# Patient Record
Sex: FEMALE | Race: WHITE | NOT HISPANIC OR LATINO | Employment: OTHER | URBAN - METROPOLITAN AREA
[De-identification: names, ages, dates, MRNs, and addresses within clinical notes are randomized per-mention and may not be internally consistent; named-entity substitution may affect disease eponyms.]

---

## 2024-04-28 ENCOUNTER — APPOINTMENT (OUTPATIENT)
Dept: RADIOLOGY | Facility: HOSPITAL | Age: 69
End: 2024-04-28
Payer: MEDICARE

## 2024-04-28 ENCOUNTER — APPOINTMENT (OUTPATIENT)
Dept: CARDIOLOGY | Facility: HOSPITAL | Age: 69
End: 2024-04-28
Payer: MEDICARE

## 2024-04-28 ENCOUNTER — HOSPITAL ENCOUNTER (OUTPATIENT)
Facility: HOSPITAL | Age: 69
Setting detail: OBSERVATION
Discharge: HOME | End: 2024-04-29
Attending: STUDENT IN AN ORGANIZED HEALTH CARE EDUCATION/TRAINING PROGRAM | Admitting: INTERNAL MEDICINE
Payer: MEDICARE

## 2024-04-28 DIAGNOSIS — I15.9 SECONDARY HYPERTENSION: ICD-10-CM

## 2024-04-28 DIAGNOSIS — R56.9 SEIZURE (MULTI): Primary | ICD-10-CM

## 2024-04-28 LAB
ALBUMIN SERPL BCP-MCNC: 4.3 G/DL (ref 3.4–5)
ALP SERPL-CCNC: 33 U/L (ref 33–136)
ALT SERPL W P-5'-P-CCNC: 10 U/L (ref 7–45)
ANION GAP SERPL CALC-SCNC: 14 MMOL/L (ref 10–20)
APPEARANCE UR: ABNORMAL
AST SERPL W P-5'-P-CCNC: 13 U/L (ref 9–39)
BACTERIA #/AREA URNS AUTO: ABNORMAL /HPF
BASOPHILS # BLD AUTO: 0.05 X10*3/UL (ref 0–0.1)
BASOPHILS NFR BLD AUTO: 1.1 %
BILIRUB SERPL-MCNC: 0.6 MG/DL (ref 0–1.2)
BILIRUB UR STRIP.AUTO-MCNC: NEGATIVE MG/DL
BUN SERPL-MCNC: 23 MG/DL (ref 6–23)
CALCIUM SERPL-MCNC: 9.4 MG/DL (ref 8.6–10.3)
CHLORIDE SERPL-SCNC: 107 MMOL/L (ref 98–107)
CO2 SERPL-SCNC: 23 MMOL/L (ref 21–32)
COLOR UR: YELLOW
CREAT SERPL-MCNC: 1.03 MG/DL (ref 0.5–1.05)
EGFRCR SERPLBLD CKD-EPI 2021: 59 ML/MIN/1.73M*2
EOSINOPHIL # BLD AUTO: 0.32 X10*3/UL (ref 0–0.7)
EOSINOPHIL NFR BLD AUTO: 6.8 %
ERYTHROCYTE [DISTWIDTH] IN BLOOD BY AUTOMATED COUNT: 17.2 % (ref 11.5–14.5)
FLUAV RNA RESP QL NAA+PROBE: NOT DETECTED
FLUBV RNA RESP QL NAA+PROBE: NOT DETECTED
GLUCOSE BLD MANUAL STRIP-MCNC: 122 MG/DL (ref 74–99)
GLUCOSE SERPL-MCNC: 111 MG/DL (ref 74–99)
GLUCOSE UR STRIP.AUTO-MCNC: NEGATIVE MG/DL
HCT VFR BLD AUTO: 36 % (ref 36–46)
HGB BLD-MCNC: 11.6 G/DL (ref 12–16)
IMM GRANULOCYTES # BLD AUTO: 0.01 X10*3/UL (ref 0–0.7)
IMM GRANULOCYTES NFR BLD AUTO: 0.2 % (ref 0–0.9)
KETONES UR STRIP.AUTO-MCNC: NEGATIVE MG/DL
LACTATE SERPL-SCNC: 1.2 MMOL/L (ref 0.4–2)
LEUKOCYTE ESTERASE UR QL STRIP.AUTO: ABNORMAL
LYMPHOCYTES # BLD AUTO: 1.8 X10*3/UL (ref 1.2–4.8)
LYMPHOCYTES NFR BLD AUTO: 38.1 %
MAGNESIUM SERPL-MCNC: 1.81 MG/DL (ref 1.6–2.4)
MCH RBC QN AUTO: 26.2 PG (ref 26–34)
MCHC RBC AUTO-ENTMCNC: 32.2 G/DL (ref 32–36)
MCV RBC AUTO: 81 FL (ref 80–100)
MONOCYTES # BLD AUTO: 0.3 X10*3/UL (ref 0.1–1)
MONOCYTES NFR BLD AUTO: 6.3 %
NEUTROPHILS # BLD AUTO: 2.25 X10*3/UL (ref 1.2–7.7)
NEUTROPHILS NFR BLD AUTO: 47.5 %
NITRITE UR QL STRIP.AUTO: POSITIVE
NRBC BLD-RTO: 0 /100 WBCS (ref 0–0)
PH UR STRIP.AUTO: 6 [PH]
PLATELET # BLD AUTO: 293 X10*3/UL (ref 150–450)
POTASSIUM SERPL-SCNC: 3.5 MMOL/L (ref 3.5–5.3)
PROT SERPL-MCNC: 6.7 G/DL (ref 6.4–8.2)
PROT UR STRIP.AUTO-MCNC: NEGATIVE MG/DL
RBC # BLD AUTO: 4.43 X10*6/UL (ref 4–5.2)
RBC # UR STRIP.AUTO: NEGATIVE /UL
RBC #/AREA URNS AUTO: ABNORMAL /HPF
SARS-COV-2 RNA RESP QL NAA+PROBE: NOT DETECTED
SODIUM SERPL-SCNC: 140 MMOL/L (ref 136–145)
SP GR UR STRIP.AUTO: 1.01
UROBILINOGEN UR STRIP.AUTO-MCNC: <2 MG/DL
WBC # BLD AUTO: 4.7 X10*3/UL (ref 4.4–11.3)
WBC #/AREA URNS AUTO: ABNORMAL /HPF
WBC CLUMPS #/AREA URNS AUTO: ABNORMAL /HPF

## 2024-04-28 PROCEDURE — G0378 HOSPITAL OBSERVATION PER HR: HCPCS

## 2024-04-28 PROCEDURE — 2500000001 HC RX 250 WO HCPCS SELF ADMINISTERED DRUGS (ALT 637 FOR MEDICARE OP): Performed by: STUDENT IN AN ORGANIZED HEALTH CARE EDUCATION/TRAINING PROGRAM

## 2024-04-28 PROCEDURE — 2500000004 HC RX 250 GENERAL PHARMACY W/ HCPCS (ALT 636 FOR OP/ED): Performed by: STUDENT IN AN ORGANIZED HEALTH CARE EDUCATION/TRAINING PROGRAM

## 2024-04-28 PROCEDURE — 83735 ASSAY OF MAGNESIUM: CPT | Performed by: STUDENT IN AN ORGANIZED HEALTH CARE EDUCATION/TRAINING PROGRAM

## 2024-04-28 PROCEDURE — 2500000004 HC RX 250 GENERAL PHARMACY W/ HCPCS (ALT 636 FOR OP/ED)

## 2024-04-28 PROCEDURE — 99223 1ST HOSP IP/OBS HIGH 75: CPT | Performed by: PSYCHIATRY & NEUROLOGY

## 2024-04-28 PROCEDURE — 83605 ASSAY OF LACTIC ACID: CPT

## 2024-04-28 PROCEDURE — 93005 ELECTROCARDIOGRAM TRACING: CPT

## 2024-04-28 PROCEDURE — 70450 CT HEAD/BRAIN W/O DYE: CPT | Performed by: RADIOLOGY

## 2024-04-28 PROCEDURE — 70450 CT HEAD/BRAIN W/O DYE: CPT

## 2024-04-28 PROCEDURE — 96375 TX/PRO/DX INJ NEW DRUG ADDON: CPT

## 2024-04-28 PROCEDURE — 71045 X-RAY EXAM CHEST 1 VIEW: CPT | Mod: FOREIGN READ | Performed by: RADIOLOGY

## 2024-04-28 PROCEDURE — 85025 COMPLETE CBC W/AUTO DIFF WBC: CPT

## 2024-04-28 PROCEDURE — 99285 EMERGENCY DEPT VISIT HI MDM: CPT | Mod: 25

## 2024-04-28 PROCEDURE — 87636 SARSCOV2 & INF A&B AMP PRB: CPT | Performed by: STUDENT IN AN ORGANIZED HEALTH CARE EDUCATION/TRAINING PROGRAM

## 2024-04-28 PROCEDURE — 81001 URINALYSIS AUTO W/SCOPE: CPT | Performed by: STUDENT IN AN ORGANIZED HEALTH CARE EDUCATION/TRAINING PROGRAM

## 2024-04-28 PROCEDURE — 82947 ASSAY GLUCOSE BLOOD QUANT: CPT

## 2024-04-28 PROCEDURE — 96372 THER/PROPH/DIAG INJ SC/IM: CPT

## 2024-04-28 PROCEDURE — 99222 1ST HOSP IP/OBS MODERATE 55: CPT

## 2024-04-28 PROCEDURE — 71045 X-RAY EXAM CHEST 1 VIEW: CPT

## 2024-04-28 PROCEDURE — 36415 COLL VENOUS BLD VENIPUNCTURE: CPT

## 2024-04-28 PROCEDURE — 2500000006 HC RX 250 W HCPCS SELF ADMINISTERED DRUGS (ALT 637 FOR ALL PAYERS)

## 2024-04-28 PROCEDURE — 80053 COMPREHEN METABOLIC PANEL: CPT

## 2024-04-28 PROCEDURE — 2500000001 HC RX 250 WO HCPCS SELF ADMINISTERED DRUGS (ALT 637 FOR MEDICARE OP)

## 2024-04-28 PROCEDURE — 87086 URINE CULTURE/COLONY COUNT: CPT | Mod: PARLAB | Performed by: STUDENT IN AN ORGANIZED HEALTH CARE EDUCATION/TRAINING PROGRAM

## 2024-04-28 RX ORDER — ATORVASTATIN CALCIUM 80 MG/1
80 TABLET, FILM COATED ORAL EVERY MORNING
COMMUNITY
Start: 2023-10-25

## 2024-04-28 RX ORDER — QUETIAPINE FUMARATE 300 MG/1
1 TABLET, FILM COATED ORAL NIGHTLY
COMMUNITY
Start: 2022-04-04

## 2024-04-28 RX ORDER — ASPIRIN 81 MG/1
81 TABLET ORAL DAILY
Status: DISCONTINUED | OUTPATIENT
Start: 2024-04-28 | End: 2024-04-29 | Stop reason: HOSPADM

## 2024-04-28 RX ORDER — FOLIC ACID 1 MG/1
1 TABLET ORAL DAILY
Status: DISCONTINUED | OUTPATIENT
Start: 2024-04-28 | End: 2024-04-29 | Stop reason: HOSPADM

## 2024-04-28 RX ORDER — PROPRANOLOL HYDROCHLORIDE 60 MG/1
1 CAPSULE, EXTENDED RELEASE ORAL DAILY
COMMUNITY
Start: 2022-05-25

## 2024-04-28 RX ORDER — BUSPIRONE HYDROCHLORIDE 10 MG/1
30 TABLET ORAL 2 TIMES DAILY
Status: DISCONTINUED | OUTPATIENT
Start: 2024-04-28 | End: 2024-04-29 | Stop reason: HOSPADM

## 2024-04-28 RX ORDER — SUMATRIPTAN 50 MG/1
100 TABLET, FILM COATED ORAL ONCE AS NEEDED
Status: DISCONTINUED | OUTPATIENT
Start: 2024-04-28 | End: 2024-04-29 | Stop reason: HOSPADM

## 2024-04-28 RX ORDER — DIAZEPAM 5 MG/ML
5 INJECTION, SOLUTION INTRAMUSCULAR; INTRAVENOUS EVERY 2 HOUR PRN
Status: DISCONTINUED | OUTPATIENT
Start: 2024-04-28 | End: 2024-04-29 | Stop reason: HOSPADM

## 2024-04-28 RX ORDER — FENOFIBRATE 145 MG/1
1 TABLET, FILM COATED ORAL DAILY
COMMUNITY

## 2024-04-28 RX ORDER — HYDROXYZINE PAMOATE 25 MG/1
50 CAPSULE ORAL 3 TIMES DAILY PRN
Status: DISCONTINUED | OUTPATIENT
Start: 2024-04-28 | End: 2024-04-29 | Stop reason: HOSPADM

## 2024-04-28 RX ORDER — FENOFIBRATE 160 MG/1
160 TABLET ORAL DAILY
Status: DISCONTINUED | OUTPATIENT
Start: 2024-04-28 | End: 2024-04-29 | Stop reason: HOSPADM

## 2024-04-28 RX ORDER — BUSPIRONE HYDROCHLORIDE 30 MG/1
30 TABLET ORAL 2 TIMES DAILY
COMMUNITY
Start: 2024-01-22

## 2024-04-28 RX ORDER — ALBUTEROL SULFATE 90 UG/1
2 AEROSOL, METERED RESPIRATORY (INHALATION) EVERY 2 HOUR PRN
Status: DISCONTINUED | OUTPATIENT
Start: 2024-04-28 | End: 2024-04-29 | Stop reason: HOSPADM

## 2024-04-28 RX ORDER — QUETIAPINE FUMARATE 100 MG/1
300 TABLET, FILM COATED ORAL NIGHTLY
Status: DISCONTINUED | OUTPATIENT
Start: 2024-04-28 | End: 2024-04-29 | Stop reason: HOSPADM

## 2024-04-28 RX ORDER — FLUTICASONE PROPIONATE 50 MCG
1 SPRAY, SUSPENSION (ML) NASAL DAILY PRN
COMMUNITY

## 2024-04-28 RX ORDER — ONDANSETRON 4 MG/1
4 TABLET, FILM COATED ORAL EVERY 8 HOURS PRN
Status: DISCONTINUED | OUTPATIENT
Start: 2024-04-28 | End: 2024-04-29 | Stop reason: HOSPADM

## 2024-04-28 RX ORDER — ALBUTEROL SULFATE 90 UG/1
2 AEROSOL, METERED RESPIRATORY (INHALATION) EVERY 6 HOURS PRN
Status: DISCONTINUED | OUTPATIENT
Start: 2024-04-28 | End: 2024-04-28

## 2024-04-28 RX ORDER — MULTIVIT-MIN/IRON FUM/FOLIC AC 7.5 MG-4
1 TABLET ORAL DAILY
Status: DISCONTINUED | OUTPATIENT
Start: 2024-04-28 | End: 2024-04-29 | Stop reason: HOSPADM

## 2024-04-28 RX ORDER — PANTOPRAZOLE SODIUM 40 MG/1
1 TABLET, DELAYED RELEASE ORAL
COMMUNITY
Start: 2018-04-27

## 2024-04-28 RX ORDER — LANOLIN ALCOHOL/MO/W.PET/CERES
100 CREAM (GRAM) TOPICAL DAILY
Status: DISCONTINUED | OUTPATIENT
Start: 2024-05-01 | End: 2024-04-29 | Stop reason: HOSPADM

## 2024-04-28 RX ORDER — CEPHRADINE 500 MG
1 CAPSULE ORAL WEEKLY
COMMUNITY

## 2024-04-28 RX ORDER — SUMATRIPTAN SUCCINATE 100 MG/1
100 TABLET ORAL ONCE AS NEEDED
COMMUNITY
Start: 2018-08-09

## 2024-04-28 RX ORDER — ENOXAPARIN SODIUM 100 MG/ML
40 INJECTION SUBCUTANEOUS EVERY 24 HOURS
Status: DISCONTINUED | OUTPATIENT
Start: 2024-04-28 | End: 2024-04-29 | Stop reason: HOSPADM

## 2024-04-28 RX ORDER — ALPRAZOLAM 0.5 MG/1
0.5 TABLET ORAL EVERY 8 HOURS PRN
COMMUNITY
Start: 2024-04-01 | End: 2024-05-01

## 2024-04-28 RX ORDER — HYDROXYZINE PAMOATE 50 MG/1
1 CAPSULE ORAL 3 TIMES DAILY PRN
COMMUNITY

## 2024-04-28 RX ORDER — ALPRAZOLAM 0.25 MG/1
0.5 TABLET ORAL EVERY 8 HOURS PRN
Status: DISCONTINUED | OUTPATIENT
Start: 2024-04-28 | End: 2024-04-29 | Stop reason: HOSPADM

## 2024-04-28 RX ORDER — MECLIZINE HYDROCHLORIDE 25 MG/1
1 TABLET ORAL 3 TIMES DAILY PRN
COMMUNITY
Start: 2024-01-01

## 2024-04-28 RX ORDER — PANTOPRAZOLE SODIUM 40 MG/1
40 TABLET, DELAYED RELEASE ORAL
Status: DISCONTINUED | OUTPATIENT
Start: 2024-04-28 | End: 2024-04-29 | Stop reason: HOSPADM

## 2024-04-28 RX ORDER — MECLIZINE HYDROCHLORIDE 25 MG/1
25 TABLET ORAL 3 TIMES DAILY PRN
Status: DISCONTINUED | OUTPATIENT
Start: 2024-04-28 | End: 2024-04-29 | Stop reason: HOSPADM

## 2024-04-28 RX ORDER — ALBUTEROL SULFATE 90 UG/1
2 AEROSOL, METERED RESPIRATORY (INHALATION) EVERY 6 HOURS PRN
COMMUNITY
Start: 2023-02-24

## 2024-04-28 RX ORDER — POLYETHYLENE GLYCOL 3350 17 G/17G
17 POWDER, FOR SOLUTION ORAL DAILY
Status: DISCONTINUED | OUTPATIENT
Start: 2024-04-28 | End: 2024-04-29 | Stop reason: HOSPADM

## 2024-04-28 RX ORDER — FLUOXETINE HYDROCHLORIDE 20 MG/1
40 CAPSULE ORAL DAILY
Status: DISCONTINUED | OUTPATIENT
Start: 2024-04-28 | End: 2024-04-29 | Stop reason: HOSPADM

## 2024-04-28 RX ORDER — ATORVASTATIN CALCIUM 80 MG/1
80 TABLET, FILM COATED ORAL EVERY MORNING
Status: DISCONTINUED | OUTPATIENT
Start: 2024-04-28 | End: 2024-04-29 | Stop reason: HOSPADM

## 2024-04-28 RX ORDER — GABAPENTIN 800 MG/1
1 TABLET ORAL 4 TIMES DAILY
COMMUNITY
Start: 2023-07-06

## 2024-04-28 RX ORDER — ASPIRIN 81 MG/1
81 TABLET ORAL DAILY
COMMUNITY
Start: 2022-05-25

## 2024-04-28 RX ORDER — GABAPENTIN 300 MG/1
300 CAPSULE ORAL 3 TIMES DAILY
Status: DISCONTINUED | OUTPATIENT
Start: 2024-04-28 | End: 2024-04-29 | Stop reason: HOSPADM

## 2024-04-28 RX ORDER — FLUOXETINE HYDROCHLORIDE 40 MG/1
1 CAPSULE ORAL DAILY
COMMUNITY
Start: 2022-04-04

## 2024-04-28 RX ORDER — ONDANSETRON HYDROCHLORIDE 2 MG/ML
4 INJECTION, SOLUTION INTRAVENOUS EVERY 8 HOURS PRN
Status: DISCONTINUED | OUTPATIENT
Start: 2024-04-28 | End: 2024-04-29 | Stop reason: HOSPADM

## 2024-04-28 RX ORDER — PROPRANOLOL HYDROCHLORIDE 60 MG/1
60 CAPSULE, EXTENDED RELEASE ORAL DAILY
Status: DISCONTINUED | OUTPATIENT
Start: 2024-04-28 | End: 2024-04-29 | Stop reason: HOSPADM

## 2024-04-28 RX ORDER — THIAMINE HYDROCHLORIDE 100 MG/ML
100 INJECTION, SOLUTION INTRAMUSCULAR; INTRAVENOUS DAILY
Status: DISCONTINUED | OUTPATIENT
Start: 2024-04-28 | End: 2024-04-29 | Stop reason: HOSPADM

## 2024-04-28 RX ORDER — FLUTICASONE PROPIONATE 50 MCG
1 SPRAY, SUSPENSION (ML) NASAL DAILY PRN
Status: DISCONTINUED | OUTPATIENT
Start: 2024-04-28 | End: 2024-04-29 | Stop reason: HOSPADM

## 2024-04-28 RX ADMIN — THIAMINE HYDROCHLORIDE 100 MG: 100 INJECTION, SOLUTION INTRAMUSCULAR; INTRAVENOUS at 11:45

## 2024-04-28 RX ADMIN — PROPRANOLOL HYDROCHLORIDE 60 MG: 60 CAPSULE, EXTENDED RELEASE ORAL at 16:18

## 2024-04-28 RX ADMIN — ALPRAZOLAM 0.5 MG: 0.25 TABLET ORAL at 16:18

## 2024-04-28 RX ADMIN — Medication 1 TABLET: at 11:46

## 2024-04-28 RX ADMIN — FOLIC ACID 1 MG: 1 TABLET ORAL at 11:45

## 2024-04-28 RX ADMIN — FLUOXETINE 40 MG: 20 CAPSULE ORAL at 16:18

## 2024-04-28 RX ADMIN — GABAPENTIN 300 MG: 300 CAPSULE ORAL at 16:18

## 2024-04-28 RX ADMIN — FENOFIBRATE 160 MG: 160 TABLET ORAL at 16:18

## 2024-04-28 RX ADMIN — BUSPIRONE HYDROCHLORIDE 30 MG: 10 TABLET ORAL at 16:18

## 2024-04-28 RX ADMIN — QUETIAPINE FUMARATE 300 MG: 100 TABLET ORAL at 21:13

## 2024-04-28 RX ADMIN — BUSPIRONE HYDROCHLORIDE 30 MG: 10 TABLET ORAL at 22:51

## 2024-04-28 RX ADMIN — ASPIRIN 81 MG: 81 TABLET, COATED ORAL at 16:18

## 2024-04-28 RX ADMIN — ENOXAPARIN SODIUM 40 MG: 40 INJECTION SUBCUTANEOUS at 21:12

## 2024-04-28 RX ADMIN — GABAPENTIN 300 MG: 300 CAPSULE ORAL at 21:13

## 2024-04-28 RX ADMIN — PANTOPRAZOLE SODIUM 40 MG: 40 TABLET, DELAYED RELEASE ORAL at 17:58

## 2024-04-28 SDOH — SOCIAL STABILITY: SOCIAL INSECURITY: DO YOU FEEL UNSAFE GOING BACK TO THE PLACE WHERE YOU ARE LIVING?: NO

## 2024-04-28 SDOH — ECONOMIC STABILITY: HOUSING INSECURITY
IN THE LAST 12 MONTHS, WAS THERE A TIME WHEN YOU DID NOT HAVE A STEADY PLACE TO SLEEP OR SLEPT IN A SHELTER (INCLUDING NOW)?: NO

## 2024-04-28 SDOH — HEALTH STABILITY: PHYSICAL HEALTH: ON AVERAGE, HOW MANY MINUTES DO YOU ENGAGE IN EXERCISE AT THIS LEVEL?: 0 MIN

## 2024-04-28 SDOH — HEALTH STABILITY: PHYSICAL HEALTH: ON AVERAGE, HOW MANY DAYS PER WEEK DO YOU ENGAGE IN MODERATE TO STRENUOUS EXERCISE (LIKE A BRISK WALK)?: 0 DAYS

## 2024-04-28 SDOH — SOCIAL STABILITY: SOCIAL INSECURITY: DOES ANYONE TRY TO KEEP YOU FROM HAVING/CONTACTING OTHER FRIENDS OR DOING THINGS OUTSIDE YOUR HOME?: NO

## 2024-04-28 SDOH — ECONOMIC STABILITY: HOUSING INSECURITY: IN THE LAST 12 MONTHS, HOW MANY PLACES HAVE YOU LIVED?: 1

## 2024-04-28 SDOH — ECONOMIC STABILITY: TRANSPORTATION INSECURITY
IN THE PAST 12 MONTHS, HAS LACK OF TRANSPORTATION KEPT YOU FROM MEETINGS, WORK, OR FROM GETTING THINGS NEEDED FOR DAILY LIVING?: NO

## 2024-04-28 SDOH — SOCIAL STABILITY: SOCIAL INSECURITY: HAVE YOU HAD THOUGHTS OF HARMING ANYONE ELSE?: NO

## 2024-04-28 SDOH — SOCIAL STABILITY: SOCIAL INSECURITY: DO YOU FEEL ANYONE HAS EXPLOITED OR TAKEN ADVANTAGE OF YOU FINANCIALLY OR OF YOUR PERSONAL PROPERTY?: NO

## 2024-04-28 SDOH — SOCIAL STABILITY: SOCIAL INSECURITY: HAVE YOU HAD ANY THOUGHTS OF HARMING ANYONE ELSE?: NO

## 2024-04-28 SDOH — ECONOMIC STABILITY: INCOME INSECURITY: HOW HARD IS IT FOR YOU TO PAY FOR THE VERY BASICS LIKE FOOD, HOUSING, MEDICAL CARE, AND HEATING?: NOT HARD AT ALL

## 2024-04-28 SDOH — ECONOMIC STABILITY: INCOME INSECURITY: IN THE LAST 12 MONTHS, WAS THERE A TIME WHEN YOU WERE NOT ABLE TO PAY THE MORTGAGE OR RENT ON TIME?: NO

## 2024-04-28 SDOH — SOCIAL STABILITY: SOCIAL INSECURITY: ARE THERE ANY APPARENT SIGNS OF INJURIES/BEHAVIORS THAT COULD BE RELATED TO ABUSE/NEGLECT?: NO

## 2024-04-28 SDOH — SOCIAL STABILITY: SOCIAL INSECURITY: ARE YOU OR HAVE YOU BEEN THREATENED OR ABUSED PHYSICALLY, EMOTIONALLY, OR SEXUALLY BY ANYONE?: NO

## 2024-04-28 SDOH — SOCIAL STABILITY: SOCIAL INSECURITY: HAS ANYONE EVER THREATENED TO HURT YOUR FAMILY OR YOUR PETS?: NO

## 2024-04-28 SDOH — ECONOMIC STABILITY: TRANSPORTATION INSECURITY
IN THE PAST 12 MONTHS, HAS THE LACK OF TRANSPORTATION KEPT YOU FROM MEDICAL APPOINTMENTS OR FROM GETTING MEDICATIONS?: NO

## 2024-04-28 SDOH — SOCIAL STABILITY: SOCIAL INSECURITY: ABUSE: ADULT

## 2024-04-28 SDOH — SOCIAL STABILITY: SOCIAL INSECURITY: WERE YOU ABLE TO COMPLETE ALL THE BEHAVIORAL HEALTH SCREENINGS?: YES

## 2024-04-28 ASSESSMENT — ACTIVITIES OF DAILY LIVING (ADL)
PATIENT'S MEMORY ADEQUATE TO SAFELY COMPLETE DAILY ACTIVITIES?: YES
GROOMING: INDEPENDENT
ASSISTIVE_DEVICE: WALKER;DENTURES UPPER;DENTURES LOWER
WALKS IN HOME: INDEPENDENT
HEARING - RIGHT EAR: FUNCTIONAL
TOILETING: NEEDS ASSISTANCE
HEARING - LEFT EAR: FUNCTIONAL
BATHING: INDEPENDENT
FEEDING YOURSELF: INDEPENDENT
JUDGMENT_ADEQUATE_SAFELY_COMPLETE_DAILY_ACTIVITIES: YES
DRESSING YOURSELF: INDEPENDENT
ADEQUATE_TO_COMPLETE_ADL: YES

## 2024-04-28 ASSESSMENT — LIFESTYLE VARIABLES
TREMOR: NO TREMOR
PAROXYSMAL SWEATS: NO SWEAT VISIBLE
ORIENTATION AND CLOUDING OF SENSORIUM: ORIENTED AND CAN DO SERIAL ADDITIONS
VISUAL DISTURBANCES: NOT PRESENT
TOTAL SCORE: 0
AGITATION: NORMAL ACTIVITY
ANXIETY: NO ANXIETY, AT EASE
AGITATION: NORMAL ACTIVITY
HAVE YOU EVER FELT YOU SHOULD CUT DOWN ON YOUR DRINKING: NO
TOTAL SCORE: 0
PAROXYSMAL SWEATS: NO SWEAT VISIBLE
HAVE PEOPLE ANNOYED YOU BY CRITICIZING YOUR DRINKING: NO
SUBSTANCE_ABUSE_PAST_12_MONTHS: NO
NAUSEA AND VOMITING: NO NAUSEA AND NO VOMITING
TREMOR: NO TREMOR
HEADACHE, FULLNESS IN HEAD: NOT PRESENT
PRESCIPTION_ABUSE_PAST_12_MONTHS: NO
TACTILE DISTURBANCES: VERY MILD ITCHING, PINS AND NEEDLES, BURNING OR NUMBNESS
EVER HAD A DRINK FIRST THING IN THE MORNING TO STEADY YOUR NERVES TO GET RID OF A HANGOVER: NO
PAROXYSMAL SWEATS: NO SWEAT VISIBLE
HEADACHE, FULLNESS IN HEAD: NOT PRESENT
VISUAL DISTURBANCES: NOT PRESENT
TREMOR: NO TREMOR
VISUAL DISTURBANCES: NOT PRESENT
TOTAL SCORE: 1
HOW OFTEN DO YOU HAVE A DRINK CONTAINING ALCOHOL: NEVER
HOW OFTEN DO YOU HAVE 6 OR MORE DRINKS ON ONE OCCASION: NEVER
TOTAL SCORE: 3
ANXIETY: NO ANXIETY, AT EASE
ANXIETY: 2
HEADACHE, FULLNESS IN HEAD: NOT PRESENT
AGITATION: NORMAL ACTIVITY
HOW MANY STANDARD DRINKS CONTAINING ALCOHOL DO YOU HAVE ON A TYPICAL DAY: PATIENT DOES NOT DRINK
PAROXYSMAL SWEATS: NO SWEAT VISIBLE
AGITATION: NORMAL ACTIVITY
NAUSEA AND VOMITING: NO NAUSEA AND NO VOMITING
NAUSEA AND VOMITING: NO NAUSEA AND NO VOMITING
ORIENTATION AND CLOUDING OF SENSORIUM: ORIENTED AND CAN DO SERIAL ADDITIONS
AUDIT-C TOTAL SCORE: 0
TACTILE DISTURBANCES: VERY MILD ITCHING, PINS AND NEEDLES, BURNING OR NUMBNESS
ORIENTATION AND CLOUDING OF SENSORIUM: ORIENTED AND CAN DO SERIAL ADDITIONS
ANXIETY: NO ANXIETY, AT EASE
AUDITORY DISTURBANCES: NOT PRESENT
ORIENTATION AND CLOUDING OF SENSORIUM: ORIENTED AND CAN DO SERIAL ADDITIONS
AUDIT-C TOTAL SCORE: 0
SKIP TO QUESTIONS 9-10: 1
EVER FELT BAD OR GUILTY ABOUT YOUR DRINKING: NO
TREMOR: NO TREMOR
AUDITORY DISTURBANCES: NOT PRESENT
AUDITORY DISTURBANCES: NOT PRESENT
TOTAL SCORE: 0
VISUAL DISTURBANCES: NOT PRESENT
AUDITORY DISTURBANCES: NOT PRESENT
HEADACHE, FULLNESS IN HEAD: NOT PRESENT
NAUSEA AND VOMITING: NO NAUSEA AND NO VOMITING

## 2024-04-28 ASSESSMENT — COGNITIVE AND FUNCTIONAL STATUS - GENERAL
DAILY ACTIVITIY SCORE: 23
MOBILITY SCORE: 24
TOILETING: A LITTLE
PATIENT BASELINE BEDBOUND: NO
TOILETING: A LITTLE
DAILY ACTIVITIY SCORE: 23
MOBILITY SCORE: 24

## 2024-04-28 ASSESSMENT — COLUMBIA-SUICIDE SEVERITY RATING SCALE - C-SSRS
6. HAVE YOU EVER DONE ANYTHING, STARTED TO DO ANYTHING, OR PREPARED TO DO ANYTHING TO END YOUR LIFE?: NO
1. IN THE PAST MONTH, HAVE YOU WISHED YOU WERE DEAD OR WISHED YOU COULD GO TO SLEEP AND NOT WAKE UP?: NO
2. HAVE YOU ACTUALLY HAD ANY THOUGHTS OF KILLING YOURSELF?: NO

## 2024-04-28 ASSESSMENT — PAIN SCALES - GENERAL
PAINLEVEL_OUTOF10: 0 - NO PAIN
PAINLEVEL_OUTOF10: 0 - NO PAIN

## 2024-04-28 ASSESSMENT — PAIN - FUNCTIONAL ASSESSMENT: PAIN_FUNCTIONAL_ASSESSMENT: 0-10

## 2024-04-28 ASSESSMENT — PATIENT HEALTH QUESTIONNAIRE - PHQ9
1. LITTLE INTEREST OR PLEASURE IN DOING THINGS: MORE THAN HALF THE DAYS
2. FEELING DOWN, DEPRESSED OR HOPELESS: MORE THAN HALF THE DAYS
SUM OF ALL RESPONSES TO PHQ9 QUESTIONS 1 & 2: 4

## 2024-04-28 ASSESSMENT — ENCOUNTER SYMPTOMS
SPEECH DIFFICULTY: 1
SEIZURES: 1

## 2024-04-28 NOTE — PROGRESS NOTES
Pharmacy Medication History Review    Taylor Winslow is a 68 y.o. female admitted for No Principal Problem: There is no principal problem currently on the Problem List. Please update the Problem List and refresh.. Pharmacy reviewed the patient's [id number] medications and allergies for accuracy.    The list below reflectives the updated PTA list. Please review each medication in order reconciliation for additional clarification and justification.  Prior to Admission medications    Medication Sig Start Date End Date Taking? Authorizing Provider   albuterol (ProAir HFA) 90 mcg/actuation inhaler Inhale 2 puffs every 6 hours if needed for shortness of breath or wheezing. 2/24/23  Yes Historical Provider, MD   aspirin 81 mg EC tablet Take 1 tablet (81 mg) by mouth once daily. 5/25/22  Yes Historical Provider, MD   atorvastatin (Lipitor) 80 mg tablet Take 1 tablet (80 mg) by mouth once daily in the morning. 10/25/23  Yes Historical Provider, MD   busPIRone (Buspar) 30 mg tablet Take 1 tablet (30 mg) by mouth twice a day. 1/22/24  Yes Historical Provider, MD   cholecalciferol, vitamin D3, 250 mcg (10,000 unit) capsule Take 1 capsule (250 mcg) by mouth 1 (one) time per week in the early morning.. Every Sunday   Yes Historical Provider, MD   fenofibrate (Tricor) 145 mg tablet Take 1 tablet (145 mg) by mouth once daily.   Yes Historical Provider, MD   FLUoxetine (PROzac) 40 mg capsule Take 1 capsule (40 mg) by mouth once daily. 4/4/22  Yes Historical Provider, MD   fluticasone (Flonase) 50 mcg/actuation nasal spray Administer 1 spray into each nostril once daily as needed for rhinitis.   Yes Historical Provider, MD   gabapentin (Neurontin) 800 mg tablet Take 1 tablet (800 mg) by mouth 4 times a day. 7/6/23  Yes Historical Provider, MD   hydrOXYzine pamoate (Vistaril) 50 mg capsule Take 1 capsule (50 mg) by mouth 3 times a day as needed for anxiety.   Yes Historical Provider, MD   meclizine (Antivert) 25 mg tablet  Take 1 tablet (25 mg) by mouth 3 times a day as needed for dizziness. 1/1/24  Yes Historical Provider, MD   multivitamin with minerals iron-free (multivit-iron-minerals-folic acid) Take 1 tablet by mouth once daily.   Yes Historical Provider, MD   pantoprazole (ProtoNix) 40 mg EC tablet Take 1 tablet (40 mg) by mouth 2 times a day before meals. 4/27/18  Yes Historical Provider, MD   propranolol LA (Inderal LA) 60 mg 24 hr capsule Take 1 capsule (60 mg) by mouth once daily. 5/25/22  Yes Historical Provider, MD   QUEtiapine (SEROquel) 300 mg tablet Take 1 tablet (300 mg) by mouth once daily at bedtime. 4/4/22  Yes Historical Provider, MD   SUMAtriptan (Imitrex) 100 mg tablet Take 1 tablet (100 mg) by mouth 1 time if needed for migraine. 8/9/18  Yes Historical Provider, MD   ALPRAZolam (Xanax) 0.5 mg tablet Take 1 tablet (0.5 mg) by mouth every 8 hours if needed for anxiety. 4/1/24 5/1/24 Yes Historical Provider, MD        The list below reflectives the updated allergy list. Please review each documented allergy for additional clarification and justification.  Allergies  Reviewed by Jennifer Curtis on 4/28/2024        Severity Reactions Comments    Erythromycin Medium Palpitations, Rash, Headache tachycardia heart beats real fast and terrible headache    Aspirin-acetaminophen-caffeine Not Specified Hives     Bromocriptine Not Specified Unknown     Flexeril [cyclobenzaprine] Not Specified Hives     Moxifloxacin Hcl Not Specified GI Upset     Prednisone Not Specified Hives             Below are additional concerns with the patient's PTA list.      Jennifer Curtis

## 2024-04-28 NOTE — ED PROVIDER NOTES
Taylor Winslow is a 68 y.o. female with a PMHx of right frontal lobe CVA in 4/2022 and CVA in 2018, TIAs, PFO, HTN, HLD, BPPV, migraines, fibromyalgia, GERD, IBS, anxiety on Xanax, depression, rectocele, mandibular osteomyelitis who presents to [facility] ED for a 3 minute witnessed seizure episode without LOC.    Subjective     Patient was brought in by EMS.  Patient is postictal, A&O x 4, ran out of Xanax prescription a few days ago.  Unsure if she was given antiseizure medications en route to the hospital.  On ED admission, patient is hypertensive at 201/125, afebrile, HR 73 bpm, 16 RR, satting 96% on RA.  Neuroexam unremarkable; nonfocal grossly, A&O x 4, CN II-XII grossly intact, intact cerebellar function.    A 10 point ROS was performed with the patient denying any complaint at this time aside from those listed in the HPI above.     No current outpatient medications   Past Medical History:   Diagnosis Date    Personal history of other diseases of the circulatory system     History of hypertension    Personal history of other diseases of the digestive system     History of hiatal hernia    Personal history of other endocrine, nutritional and metabolic disease     History of high cholesterol    Personal history of other mental and behavioral disorders     History of depression      Past Surgical History:   Procedure Laterality Date    HYSTERECTOMY  08/24/2018    Hysterectomy    MR HEAD ANGIO WO IV CONTRAST  4/2/2022    MR HEAD ANGIO WO IV CONTRAST 4/2/2022 [facility]    MR NECK ANGIO WO IV CONTRAST  4/2/2022    MR NECK ANGIO WO IV CONTRAST 4/2/2022 [facility]    OTHER SURGICAL HISTORY  08/24/2018    Bunion Correct Osteotomy Blount Double-Stem Lesser MTP Impl    OTHER SURGICAL HISTORY  03/25/2019    Neck surgery    ROTATOR CUFF REPAIR  08/24/2018    Rotator Cuff Repair     No family history on file.  Social History     Socioeconomic History    Marital status:      Spouse name: Not on  "file    Number of children: Not on file    Years of education: Not on file    Highest education level: Not on file   Occupational History    Not on file   Tobacco Use    Smoking status: Not on file    Smokeless tobacco: Not on file   Substance and Sexual Activity    Alcohol use: Not on file    Drug use: Not on file    Sexual activity: Not on file   Other Topics Concern    Not on file   Social History Narrative    Not on file     Social Determinants of Health     Financial Resource Strain: Not on file   Food Insecurity: Not on file   Transportation Needs: Not on file   Physical Activity: Not on file   Stress: Not on file   Social Connections: Not on file   Intimate Partner Violence: Not on file   Housing Stability: Not on file        Code Status: No Order    Objective   Patient Vitals for the past 24 hrs:   BP Temp Pulse Resp SpO2 Height Weight   04/28/24 1200 170/84 -- 61 16 97 % -- --   04/28/24 1145 -- -- 64 -- 98 % -- --   04/28/24 1130 -- -- 66 -- 97 % -- --   04/28/24 1115 (!) 185/92 -- 70 -- 96 % -- --   04/28/24 1103 (!) 201/125 36.6 °C (97.9 °F) 73 16 96 % 1.499 m (4' 11\") 57.2 kg (126 lb)        Labs, radiological imaging and cardiac work up were personally reviewed    Input/Output:  No intake or output data in the 24 hours ending 04/28/24 1228  Ventilator/Oxygen Supply:     Oxygen Therapy/Pulse Ox  Medical Gas Therapy: None (Room air)  Pulse Ox: 97 %  Temperature: 36.6 °C (97.9 °F)  Labs:   Results from last 7 days   Lab Units 04/28/24  1122   SODIUM mmol/L 140   POTASSIUM mmol/L 3.5   CHLORIDE mmol/L 107   CO2 mmol/L 23   BUN mg/dL 23   CREATININE mg/dL 1.03   GLUCOSE mg/dL 111*   CALCIUM mg/dL 9.4     Results from last 7 days   Lab Units 04/28/24  1122   WBC AUTO x10*3/uL 4.7   HEMOGLOBIN g/dL 11.6*   HEMATOCRIT % 36.0   PLATELETS AUTO x10*3/uL 293         Labs Reviewed   CBC WITH AUTO DIFFERENTIAL - Abnormal       Result Value    WBC 4.7      nRBC 0.0      RBC 4.43      Hemoglobin 11.6 (*)     " Hematocrit 36.0      MCV 81      MCH 26.2      MCHC 32.2      RDW 17.2 (*)     Platelets 293      Neutrophils % 47.5      Immature Granulocytes %, Automated 0.2      Lymphocytes % 38.1      Monocytes % 6.3      Eosinophils % 6.8      Basophils % 1.1      Neutrophils Absolute 2.25      Immature Granulocytes Absolute, Automated 0.01      Lymphocytes Absolute 1.80      Monocytes Absolute 0.30      Eosinophils Absolute 0.32      Basophils Absolute 0.05     COMPREHENSIVE METABOLIC PANEL - Abnormal    Glucose 111 (*)     Sodium 140      Potassium 3.5      Chloride 107      Bicarbonate 23      Anion Gap 14      Urea Nitrogen 23      Creatinine 1.03      eGFR 59 (*)     Calcium 9.4      Albumin 4.3      Alkaline Phosphatase 33      Total Protein 6.7      AST 13      Bilirubin, Total 0.6      ALT 10     POCT GLUCOSE - Abnormal    POCT Glucose 122 (*)    LACTATE - Normal    Lactate 1.2      Narrative:     Venipuncture immediately after or during the administration of Metamizole may lead to falsely low results. Testing should be performed immediately  prior to Metamizole dosing.   MAGNESIUM - Normal    Magnesium 1.81     SARS-COV-2 AND INFLUENZA A/B PCR - Normal    Flu A Result Not Detected      Flu B Result Not Detected      Coronavirus 2019, PCR Not Detected      Narrative:     This assay has received FDA Emergency Use Authorization (EUA) and  is only authorized for the duration of time that circumstances exist to justify the authorization of the emergency use of in vitro diagnostic tests for the detection of SARS-CoV-2 virus and/or diagnosis of COVID-19 infection under section 564(b)(1) of the Act, 21 U.S.C. 360bbb-3(b)(1). Testing for SARS-CoV-2 is only recommended for patients who meet current clinical and/or epidemiological criteria as defined by federal, state, or local public health directives. This assay is an in vitro diagnostic nucleic acid amplification test for the qualitative detection of SARS-CoV-2, Influenza A,  and Influenza B from nasopharyngeal specimens and has been validated for use at [facility]. Negative results do not preclude COVID-19 infections or Influenza A/B infections, and should not be used as the sole basis for diagnosis, treatment, or other management decisions. If Influenza A/B and RSV PCR results are negative, testing for Parainfluenza virus, Adenovirus and Metapneumovirus is routinely performed for [facility] pediatric oncology and intensive care inpatients, and is available on other patients by placing an add-on request.    URINALYSIS WITH REFLEX CULTURE AND MICROSCOPIC    Narrative:     The following orders were created for panel order Urinalysis with Reflex Culture and Microscopic.  Procedure                               Abnormality         Status                     ---------                               -----------         ------                     Urinalysis with Reflex C...[[id number]]                                                 Extra Urine Gray Tube[[id number]]                                                         Please view results for these tests on the individual orders.   URINALYSIS WITH REFLEX CULTURE AND MICROSCOPIC   EXTRA URINE GRAY TUBE     Imaging:  BI mammo bilateral screening    Result Date: 4/4/2024  * * *Final Report* * * DATE OF EXAM: Apr  3 2024  2:55PM   [facility]  [facility] SCREENING  / ACCESSION #  [id number] PROCEDURE REASON: Screening mammogram for breast cancer      * * * * Physician Interpretation * * * * RESULT: #[id number] - [facility] SCREENING BILATERAL DIGITAL SCREENING MAMMOGRAM WITH CAD: 4/3/2024 HISTORY: Screening Mammogram For Breast Cancer / Screening Mammogram-Patient reports NO symptoms. / Screening Mammogram-Patient reports NO symptoms. RESULT: TECHNIQUE: The study was acquired using full field digital technology and interpreted from soft copy. Current study was also evaluated with a Computer Aided Detection (CAD). Comparison is made to exams  dated: 2/20/2023 mammogram, 9/17/2020 mammogram, 2/28/2019 mammogram, and 12/12/2017 mammogram - [facility].  There are scattered areas of fibroglandular density. A medical device partially obscures visualization of the LEFT breast. No significant masses, calcifications, or other findings are seen in either breast. There has been no significant interval change.    IMPRESSION: BENIGN FINDING There is no mammographic evidence of malignancy. A 1 year screening mammogram is recommended. Den castro/lesley:4/4/2024 19:24:37 Imaging Technologist(s): RT Joel(R)(M), [facility] letter sent: Normal over 40 Mammogram BI-RADS: 2 Benign finding Multiple national specialty organizations have released breast cancer screening guidelines for women at average risk for developing breast cancer - guidelines that are based on both evidence and opinion, yet differ on when to start and how often to screen for breast cancer. With representation from Breast Imaging, Internal Medicine, Women's Health, Family Medicine, and Medical/Surgical Oncology, the [facility] has carefully reviewed the data and reached the following consensus: 1) All women should engage in shared decision-making with their providers to decide when to start and how often to screen; 2) All women should have the opportunity to start screening mammography at age 40; 3) For women ages 45-55, we recommend annual screening mammograms; 4) For women ages 55 and over, we support both the transition from an annual to a biennial interval if this aligns more with patient's values and preferences, or continuation with annual screening; 5) All women should discuss with their providers when to stop screening mammograms. : Lesley Transcribe Date/Time: Apr  3 2024  2:41P Dictated by: DEN LOVELACE MD This examination was interpreted and the report reviewed and electronically signed by: DEN  MD [place] on Apr 4 2024  7:24PM  EST    XR chest 1 view   Final Result   1.  No acute findings on single AP view of the chest.   Signed by Ray Gordon MD      CT head wo IV contrast   Final Result   No acute findings. Examination appears similar prior of 2022        Signed by: David Flores 4/28/2024 11:48 AM   Dictation workstation:   [id number]        Medications:  Inpatient scheduled medications:  folic acid, 1 mg, oral, Daily  multivitamin with minerals, 1 tablet, oral, Daily  [START ON 5/1/2024] thiamine, 100 mg, oral, Daily  thiamine, 100 mg, intravenous, Daily       Inpatient PRN medications:  PRN medications: diazePAM  Inpatient continuous medications:        Physical Exam:   GENERAL: Awake/alert, cooperative, pleasant.  HEAD:  Normocephalic, atraumatic.  EYES:  Round and reactive. Anicteric.  ENT:  No nasal discharge, mucous membranes moist and pink.  NECK:  Atraumatic. No JVD, cervical lymphadenopathy bilaterally.  CARDIOVASCULAR: HRRR without MRG.  RESPIRATORY: CTAB.  ABDOMEN:  Soft, no tenderness, nondistended, hypoactive bowel sounds.  EXTREMITIES:  No peripheral edema, no calf tenderness. Peripheral pulses intact.  SKIN:  Warm and dry. No tenting. No rash or open wound noted.  NEUROLOGICAL:  Nonfocal grossly.  A&O x 4. CN II-XII grossly intact.    Medical Decision Making    Differential diagnosis: Benzo withdrawal, intracranial hemorrhage, electrolyte abnormality, seizure disorder, UTI, pneumonia,          ED Course as of 04/28/24 1235   Sun Apr 28, 2024   1124 68-year-old female previous history of CVA in 2018 with no residual deficits, TIA, hypertension hyperlipidemia fibromyalgia anxiety on Xanax presented to the emergency department after witnessed seizure episode by family.  Patient reportedly was is postictal according to  however is alert and oriented x 3 for me.  She reports of having no discomfort.  They state that patient ran out of her Xanax few days ago and subsequently  had the seizure activity.  Cranial nerves II through XII intact light touch sensation grossly intact in all 4 extremities GCS 15.  No gross deformities to extremities.  Patient is alert and oriented.  Current CIWA is 0.  Unclear if patient received benzodiazepines en route.  Differential diagnosis includes benzodiazepine withdrawal, intracranial hemorrhage, electrolyte abnormality, underlying infectious process such as a urinary tract infection pneumonia.  Broad workup including EKG CBC CMP magnesium fingerstick glucose chest x-ray CT imaging the head and urinalysis have been ordered patient will routinely relator.  CIWA protocol was also initiated with as needed Valium [ZS]   1124 EKG interpreted by me shows sinus rhythm no acute STEMI nonpathologic left axis deviation ventricular 72  QRS 92 QTc 475 no other acute ischemic changes appreciated [ZS]   1200 CT imaging on my interpretation shows no evidence of acute intracranial hemorrhage CBC unremarkable [ZS]   1223 CMP unremarkable.  Influenza COVID-19 testing negative magnesium 1.81 and lactate 1.2. [ZS]   1234 Case was discussed with Dr. Sequeira patient will be admitted to medicine for further workup this seems to be less likely secondary to benzodiazepine withdrawal [ZS]      ED Course User Index  [ZS] Natali Agarwal MD         Diagnoses as of 04/28/24 1235   Seizure (Multi)   Secondary hypertension       Alin Apple DO   Internal Medicine PGY-1     This is a preliminary note written by the resident. Please wait for attending addendum for finalization of note and recommendations.       Alin Apple DO  Resident  04/28/24 1252

## 2024-04-28 NOTE — CONSULTS
Inpatient consult to Neurology  Consult performed by: Solange King DO  Consult ordered by: David Penn PA-C          History Of Present Illness  Taylor Winslow is a 68 y.o. female with a history of right frontal lobe stroke in 4/2002, TIA, HTN, HLD, BPPV, migraines, fibromyaglia, GERD, IBS, anxiety on xanax, depression whom presented with seizure like activity. History obtained from patient and her . Patient reports that she thinks she was awake during the entire event.  She reports that her left side started to shake and she was unable to speak.  She thinks her whole body was shaking at some point.  states it was mostly the left side, eyes were open however she was not responding and her head was twisted to the left.  He felt it lasted for 3 minutes.  Afterwards she started talking fairly quickly after the shaking stopped. There was no tongue biting or urinary incontinence. She reports that she felt off for the 2 days prior to the event and was having difficulty with word finding the night prior. She feels completely back to her baseline now although her  has noticed some mild word finding difficulties in the hospital.      Past Medical History  Past Medical History:   Diagnosis Date    Personal history of other diseases of the circulatory system     History of hypertension    Personal history of other diseases of the digestive system     History of hiatal hernia    Personal history of other endocrine, nutritional and metabolic disease     History of high cholesterol    Personal history of other mental and behavioral disorders     History of depression     Surgical History  Past Surgical History:   Procedure Laterality Date    HYSTERECTOMY  08/24/2018    Hysterectomy    MR HEAD ANGIO WO IV CONTRAST  4/2/2022    MR HEAD ANGIO WO IV CONTRAST 4/2/2022 [facility]    MR NECK ANGIO WO IV CONTRAST  4/2/2022    MR NECK ANGIO WO IV CONTRAST 4/2/2022 [facility]GAC[facility]    OTHER  SURGICAL HISTORY  08/24/2018    Bunion Correct Osteotomy Blount Double-Stem Lesser MTP Impl    OTHER SURGICAL HISTORY  03/25/2019    Neck surgery    ROTATOR CUFF REPAIR  08/24/2018    Rotator Cuff Repair     Social History     Allergies  Erythromycin, Aspirin-acetaminophen-caffeine, Bromocriptine, Flexeril [cyclobenzaprine], Moxifloxacin hcl, and Prednisone  (Not in a hospital admission)      Review of Systems   Neurological:  Positive for seizures and speech difficulty.   All other systems reviewed and are negative.    Neurological Exam  Physical Exam    On general examination, the patient is well appearing and well groomed. Heart is regular, rate and rhythm. Lungs are clear to ausculation bilaterally. There is no peripheral edema. Normal pedal pulses bilaterally. No carotid bruits.   On neurologic examination, the mental status is unremarkable to informal testing. The history is related in quite good detail with no obvious deficit of attention, memory or language. Fund of knowledge is adequate. Orientation is intact to person, place and time. Spontaneous speech is fluent with no paraphasic or aphasic errors. On cranial nerve exam, the pupils are equal, round and reactive to light. Extraocular movements are full, without nystagmus. Visual fields are full to confrontation.  There is no bulbofacial weakness or ptosis.  The tongue is midline with no wasting or fasciculations. The palate elevates symmetrically. Facial sensation is intact to light touch and pinprick bilaterally. Hearing is intact to finger rub bilaterally. Shoulder shrug is 5/5 bilaterally.  Neck flexion and extension have full strength. Motor examination reveals normal tone and bulk in the upper and lower extremities bilaterally. There are no fasciculations. There is full strength in the proximal and distal muscles of the upper and lower extremities bilaterally. Deep tendon reflexes are 2/4 and symmetric throughout the upper and lower extremities  "bilaterally, including the ankle jerks. Plantar responses are flexor bilaterally. Fine finger movements and rapid alternating movements are done well in both hands. There is no tremor. On coordination testing, finger-nose-finger testing are done well bilaterally. Sensory examination reveals normal light touch sensation in the distal upper and lower extremities bilaterally. Gait is deferred.    Last Recorded Vitals  Blood pressure (!) 183/86, pulse 69, temperature 36.6 °C (97.9 °F), resp. rate 16, height 1.499 m (4' 11\"), weight 57.2 kg (126 lb), SpO2 94%.    Relevant Results  Scheduled medications  aspirin, 81 mg, oral, Daily  atorvastatin, 80 mg, oral, q AM  busPIRone, 30 mg, oral, BID  enoxaparin, 40 mg, subcutaneous, q24h  fenofibrate, 160 mg, oral, Daily  FLUoxetine, 40 mg, oral, Daily  folic acid, 1 mg, oral, Daily  gabapentin, 300 mg, oral, TID  multivitamin with minerals, 1 tablet, oral, Daily  pantoprazole, 40 mg, oral, BID AC  polyethylene glycol, 17 g, oral, Daily  propranolol LA, 60 mg, oral, Daily  QUEtiapine, 300 mg, oral, Nightly  [START ON 5/1/2024] thiamine, 100 mg, oral, Daily  thiamine, 100 mg, intravenous, Daily      Continuous medications     PRN medications  PRN medications: albuterol, ALPRAZolam, diazePAM, fluticasone, hydrOXYzine pamoate, meclizine, ondansetron **OR** ondansetron, SUMAtriptan                  [place]a Coma Scale  Best Eye Response: Spontaneous  Best Verbal Response: Oriented  Best Motor Response: Follows commands  Shaniqua Coma Scale Score: 15                 I  CT head wo IV contrast    Result Date: 4/28/2024  Interpreted By:  David Flores, STUDY: CT HEAD WO IV CONTRAST;  4/28/2024 11:37 am   INDICATION: Signs/Symptoms:seizure.   COMPARISON: 04/02/2020   ACCESSION NUMBER(S): [id number]   ORDERING CLINICIAN: ANKIT DUNCAN   TECHNIQUE: Noncontrast axial CT scan of head was performed. Angled reformats in brain and bone windows were generated. The images were reviewed in bone, " brain, blood and soft tissue windows.   FINDINGS: No acute edema. No acute hemorrhage. No mass effect. Ventricular system is normal for age. No extra-axial fluid collections. Orbits are normal. Paranasal sinuses are clear.   There is an old infarct of the right periventricular white matter measuring 9 mm.       No acute findings. Examination appears similar prior of 2022   Signed by: David Flores 4/28/2024 11:48 AM Dictation workstation:   [id number]         Assessment/Plan   Principal Problem:    Seizure (Multi)      Ms. Winslow is a 68 year old woman presenting for new onset seizure. Description is most consistent with a focal seizure although history of possible whole body shaking at one point could be secondary generalization (seems less likely as patient believes that she was wake the whole time).  Given prior history of right frontal lobe stroke patient is at high risk for another seizure and will need to be started on AED medication. Recommend getting EEG tomorrow first then starting Keppra 500 mg BID.  MRI brain w/wo contrast seizure protocol as ordered for further evaluation.  Will need to follow up with outpatient neurologist at [facility] after discharge.     Neurology will continue to follow.              Solange King DO

## 2024-04-28 NOTE — H&P
History Of Present Illness  Taylor Winslow is a 68 y.o. female presenting with PMHx of right frontal lobe CVA in 4/2022 and CVA in 2018, TIAs, PFO, HTN, HLD, BPPV, migraines, fibromyalgia, GERD, IBS, anxiety on Xanax, depression, rectocele, mandibular osteomyelitis who presents to [facility] ED for a 3 minute witnessed seizure episode without LOC.   notes a seizure of tonic-clonic type.  Denies alcohol use.  Patient states she has been more anxious the last few days and has had to use her walker more for mobility, otherwise has been in her usual state of health.    ED course: Imaging of head and chest showed no evidence of acute findings.  Patient treated with thiamine, multivitamin, folic acid.  [facility] protocol implemented.  EEG ordered.  Patient will be admitted to observation with telemetry under Dr. Hood for further evaluation and care    Vitals: Hypertensive on admission at 201/125, has since resolved to 170/84 as of latest reading.    Labs:  GEN CHEM: Glucose 111, GFR 59  Molecular micro: Negative for flu/COVID     Past Medical History  Past Medical History:   Diagnosis Date    Personal history of other diseases of the circulatory system     History of hypertension    Personal history of other diseases of the digestive system     History of hiatal hernia    Personal history of other endocrine, nutritional and metabolic disease     History of high cholesterol    Personal history of other mental and behavioral disorders     History of depression       Surgical History  Past Surgical History:   Procedure Laterality Date    HYSTERECTOMY  08/24/2018    Hysterectomy    MR HEAD ANGIO WO IV CONTRAST  4/2/2022    MR HEAD ANGIO WO IV CONTRAST 4/2/2022 [facility]    MR NECK ANGIO WO IV CONTRAST  4/2/2022    MR NECK ANGIO WO IV CONTRAST 4/2/2022 [facility]    OTHER SURGICAL HISTORY  08/24/2018    Bunion Correct Osteotomy Blount Double-Stem Lesser MTP Impl    OTHER SURGICAL HISTORY  03/25/2019     "Neck surgery    ROTATOR CUFF REPAIR  08/24/2018    Rotator Cuff Repair        Social History  She has no history on file for tobacco use, alcohol use, and drug use.  Patient lives with  in two-story home, independent with daily activities.  Uses walker as needed.    Family History  No family history on file.     Allergies  Erythromycin, Aspirin-acetaminophen-caffeine, Bromocriptine, Flexeril [cyclobenzaprine], Moxifloxacin hcl, and Prednisone    Review of Systems  10 point ROS negative except as specified in HPI    Physical Exam  Constitutional:       General: She is not in acute distress.     Appearance: Normal appearance. She is not toxic-appearing.   HENT:      Head: Normocephalic and atraumatic.      Right Ear: External ear normal.      Left Ear: External ear normal.   Eyes:      Conjunctiva/sclera: Conjunctivae normal.   Cardiovascular:      Rate and Rhythm: Normal rate and regular rhythm.      Pulses: Normal pulses.      Heart sounds: Normal heart sounds. No murmur heard.  Pulmonary:      Effort: No respiratory distress.      Breath sounds: Normal breath sounds. No stridor. No wheezing.   Abdominal:      General: Abdomen is flat. Bowel sounds are normal. There is no distension.      Palpations: Abdomen is soft.      Tenderness: There is no abdominal tenderness. There is no guarding.   Musculoskeletal:         General: No swelling, deformity or signs of injury.      Cervical back: Normal range of motion and neck supple.      Right lower leg: No edema.      Left lower leg: No edema.      Comments: Moving arms and legs   Skin:     General: Skin is warm and dry.      Coloration: Skin is not pale.      Findings: No erythema.   Neurological:      General: No focal deficit present.      Mental Status: She is alert.   Psychiatric:         Behavior: Behavior normal.          Last Recorded Vitals  Blood pressure 172/87, pulse 60, temperature 36.6 °C (97.9 °F), resp. rate 19, height 1.499 m (4' 11\"), weight 57.2 " kg (126 lb), SpO2 96%.    Relevant Results    Results for orders placed or performed during the hospital encounter of 04/28/24 (from the past 24 hour(s))   POCT GLUCOSE   Result Value Ref Range    POCT Glucose 122 (H) 74 - 99 mg/dL   CBC and Auto Differential   Result Value Ref Range    WBC 4.7 4.4 - 11.3 x10*3/uL    nRBC 0.0 0.0 - 0.0 /100 WBCs    RBC 4.43 4.00 - 5.20 x10*6/uL    Hemoglobin 11.6 (L) 12.0 - 16.0 g/dL    Hematocrit 36.0 36.0 - 46.0 %    MCV 81 80 - 100 fL    MCH 26.2 26.0 - 34.0 pg    MCHC 32.2 32.0 - 36.0 g/dL    RDW 17.2 (H) 11.5 - 14.5 %    Platelets 293 150 - 450 x10*3/uL    Neutrophils % 47.5 40.0 - 80.0 %    Immature Granulocytes %, Automated 0.2 0.0 - 0.9 %    Lymphocytes % 38.1 13.0 - 44.0 %    Monocytes % 6.3 2.0 - 10.0 %    Eosinophils % 6.8 0.0 - 6.0 %    Basophils % 1.1 0.0 - 2.0 %    Neutrophils Absolute 2.25 1.20 - 7.70 x10*3/uL    Immature Granulocytes Absolute, Automated 0.01 0.00 - 0.70 x10*3/uL    Lymphocytes Absolute 1.80 1.20 - 4.80 x10*3/uL    Monocytes Absolute 0.30 0.10 - 1.00 x10*3/uL    Eosinophils Absolute 0.32 0.00 - 0.70 x10*3/uL    Basophils Absolute 0.05 0.00 - 0.10 x10*3/uL   Comprehensive metabolic panel   Result Value Ref Range    Glucose 111 (H) 74 - 99 mg/dL    Sodium 140 136 - 145 mmol/L    Potassium 3.5 3.5 - 5.3 mmol/L    Chloride 107 98 - 107 mmol/L    Bicarbonate 23 21 - 32 mmol/L    Anion Gap 14 10 - 20 mmol/L    Urea Nitrogen 23 6 - 23 mg/dL    Creatinine 1.03 0.50 - 1.05 mg/dL    eGFR 59 (L) >60 mL/min/1.73m*2    Calcium 9.4 8.6 - 10.3 mg/dL    Albumin 4.3 3.4 - 5.0 g/dL    Alkaline Phosphatase 33 33 - 136 U/L    Total Protein 6.7 6.4 - 8.2 g/dL    AST 13 9 - 39 U/L    Bilirubin, Total 0.6 0.0 - 1.2 mg/dL    ALT 10 7 - 45 U/L   Lactate   Result Value Ref Range    Lactate 1.2 0.4 - 2.0 mmol/L   Magnesium   Result Value Ref Range    Magnesium 1.81 1.60 - 2.40 mg/dL   Sars-CoV-2 and Influenza A/B PCR   Result Value Ref Range    Flu A Result Not Detected Not  Detected    Flu B Result Not Detected Not Detected    Coronavirus 2019, PCR Not Detected Not Detected      XR chest 1 view    Result Date: 4/28/2024  STUDY: Chest Radiograph;  04/28/2024 12:08PM INDICATION: Seizure. COMPARISON: XR Chest 08/21/2018, CTA Chest Abdomen and Pelvis 06/26/2018 ACCESSION NUMBER(S): [id number] ORDERING CLINICIAN: ANKIT DUNCAN TECHNIQUE:  Frontal chest was obtained at 12:08 hours. FINDINGS: No acute opacities or effusions are visualized.  Heart size is top normal.  There is no evidence of a pneumothorax.  A cardiac monitoring device is present.    1.  No acute findings on single AP view of the chest. Signed by Ray Gordon MD    CT head wo IV contrast    Result Date: 4/28/2024  Interpreted By:  David Flores, STUDY: CT HEAD WO IV CONTRAST;  4/28/2024 11:37 am   INDICATION: Signs/Symptoms:seizure.   COMPARISON: 04/02/2020   ACCESSION NUMBER(S): [id number]   ORDERING CLINICIAN: ANKIT DUNCAN   TECHNIQUE: Noncontrast axial CT scan of head was performed. Angled reformats in brain and bone windows were generated. The images were reviewed in bone, brain, blood and soft tissue windows.   FINDINGS: No acute edema. No acute hemorrhage. No mass effect. Ventricular system is normal for age. No extra-axial fluid collections. Orbits are normal. Paranasal sinuses are clear.   There is an old infarct of the right periventricular white matter measuring 9 mm.       No acute findings. Examination appears similar prior of 2022   Signed by: David Flores 4/28/2024 11:48 AM Dictation workstation:   [id number]       Assessment/Plan   Principal Problem:    Seizure (Multi)    #Seizure  #Secondary hypertension  - admitted to observation with telemetry under Dr. Hood  - Neuro consult  - Seizure precautions  - Fall precautions  - Follow EEG  - Morning labs  - Follow UA    #DVT PPx  -Lovenox  -SCD    #Chronic conditions: HTN, HLD, fibromyalgia, GERD, IBS, anxiety, depression  - Cardiac diet  - Continue home  meds       I spent 40 minutes in the professional and overall care of this patient.      David Penn PA-C

## 2024-04-29 ENCOUNTER — APPOINTMENT (OUTPATIENT)
Dept: RADIOLOGY | Facility: HOSPITAL | Age: 69
End: 2024-04-29
Payer: MEDICARE

## 2024-04-29 ENCOUNTER — APPOINTMENT (OUTPATIENT)
Dept: NEUROLOGY | Facility: HOSPITAL | Age: 69
End: 2024-04-29
Payer: MEDICARE

## 2024-04-29 VITALS
WEIGHT: 126 LBS | TEMPERATURE: 97 F | HEART RATE: 74 BPM | SYSTOLIC BLOOD PRESSURE: 140 MMHG | HEIGHT: 59 IN | RESPIRATION RATE: 18 BRPM | OXYGEN SATURATION: 89 % | BODY MASS INDEX: 25.4 KG/M2 | DIASTOLIC BLOOD PRESSURE: 76 MMHG

## 2024-04-29 LAB
ANION GAP SERPL CALC-SCNC: 12 MMOL/L (ref 10–20)
BACTERIA UR CULT: ABNORMAL
BUN SERPL-MCNC: 23 MG/DL (ref 6–23)
CALCIUM SERPL-MCNC: 9.1 MG/DL (ref 8.6–10.3)
CHLORIDE SERPL-SCNC: 109 MMOL/L (ref 98–107)
CO2 SERPL-SCNC: 24 MMOL/L (ref 21–32)
CREAT SERPL-MCNC: 1.05 MG/DL (ref 0.5–1.05)
EGFRCR SERPLBLD CKD-EPI 2021: 58 ML/MIN/1.73M*2
ERYTHROCYTE [DISTWIDTH] IN BLOOD BY AUTOMATED COUNT: 17.3 % (ref 11.5–14.5)
GLUCOSE SERPL-MCNC: 92 MG/DL (ref 74–99)
HCT VFR BLD AUTO: 33.4 % (ref 36–46)
HGB BLD-MCNC: 10.5 G/DL (ref 12–16)
HOLD SPECIMEN: NORMAL
MCH RBC QN AUTO: 26 PG (ref 26–34)
MCHC RBC AUTO-ENTMCNC: 31.4 G/DL (ref 32–36)
MCV RBC AUTO: 83 FL (ref 80–100)
NRBC BLD-RTO: 0 /100 WBCS (ref 0–0)
PLATELET # BLD AUTO: 279 X10*3/UL (ref 150–450)
POTASSIUM SERPL-SCNC: 3.9 MMOL/L (ref 3.5–5.3)
RBC # BLD AUTO: 4.04 X10*6/UL (ref 4–5.2)
SODIUM SERPL-SCNC: 141 MMOL/L (ref 136–145)
WBC # BLD AUTO: 5.9 X10*3/UL (ref 4.4–11.3)

## 2024-04-29 PROCEDURE — 80048 BASIC METABOLIC PNL TOTAL CA: CPT

## 2024-04-29 PROCEDURE — 2550000001 HC RX 255 CONTRASTS: Performed by: INTERNAL MEDICINE

## 2024-04-29 PROCEDURE — 2500000001 HC RX 250 WO HCPCS SELF ADMINISTERED DRUGS (ALT 637 FOR MEDICARE OP): Performed by: NURSE PRACTITIONER

## 2024-04-29 PROCEDURE — 85027 COMPLETE CBC AUTOMATED: CPT

## 2024-04-29 PROCEDURE — 95816 EEG AWAKE AND DROWSY: CPT

## 2024-04-29 PROCEDURE — G0378 HOSPITAL OBSERVATION PER HR: HCPCS

## 2024-04-29 PROCEDURE — 2500000006 HC RX 250 W HCPCS SELF ADMINISTERED DRUGS (ALT 637 FOR ALL PAYERS)

## 2024-04-29 PROCEDURE — 96365 THER/PROPH/DIAG IV INF INIT: CPT

## 2024-04-29 PROCEDURE — 70553 MRI BRAIN STEM W/O & W/DYE: CPT | Performed by: RADIOLOGY

## 2024-04-29 PROCEDURE — 96376 TX/PRO/DX INJ SAME DRUG ADON: CPT

## 2024-04-29 PROCEDURE — 2500000004 HC RX 250 GENERAL PHARMACY W/ HCPCS (ALT 636 FOR OP/ED): Performed by: INTERNAL MEDICINE

## 2024-04-29 PROCEDURE — 2500000001 HC RX 250 WO HCPCS SELF ADMINISTERED DRUGS (ALT 637 FOR MEDICARE OP)

## 2024-04-29 PROCEDURE — 36415 COLL VENOUS BLD VENIPUNCTURE: CPT

## 2024-04-29 PROCEDURE — 2500000001 HC RX 250 WO HCPCS SELF ADMINISTERED DRUGS (ALT 637 FOR MEDICARE OP): Performed by: STUDENT IN AN ORGANIZED HEALTH CARE EDUCATION/TRAINING PROGRAM

## 2024-04-29 PROCEDURE — 99232 SBSQ HOSP IP/OBS MODERATE 35: CPT | Performed by: NURSE PRACTITIONER

## 2024-04-29 PROCEDURE — 70553 MRI BRAIN STEM W/O & W/DYE: CPT

## 2024-04-29 PROCEDURE — 2500000004 HC RX 250 GENERAL PHARMACY W/ HCPCS (ALT 636 FOR OP/ED): Performed by: STUDENT IN AN ORGANIZED HEALTH CARE EDUCATION/TRAINING PROGRAM

## 2024-04-29 PROCEDURE — A9575 INJ GADOTERATE MEGLUMI 0.1ML: HCPCS | Performed by: INTERNAL MEDICINE

## 2024-04-29 PROCEDURE — 95816 EEG AWAKE AND DROWSY: CPT | Performed by: PSYCHIATRY & NEUROLOGY

## 2024-04-29 RX ORDER — LEVETIRACETAM 500 MG/1
500 TABLET ORAL 2 TIMES DAILY
Qty: 60 TABLET | Refills: 1 | Status: SHIPPED | OUTPATIENT
Start: 2024-04-29

## 2024-04-29 RX ORDER — GADOTERATE MEGLUMINE 376.9 MG/ML
11 INJECTION INTRAVENOUS
Status: COMPLETED | OUTPATIENT
Start: 2024-04-29 | End: 2024-04-29

## 2024-04-29 RX ORDER — CEFTRIAXONE 1 G/50ML
1 INJECTION, SOLUTION INTRAVENOUS EVERY 24 HOURS
Status: DISCONTINUED | OUTPATIENT
Start: 2024-04-29 | End: 2024-04-29 | Stop reason: HOSPADM

## 2024-04-29 RX ORDER — LEVETIRACETAM 500 MG/1
500 TABLET ORAL 2 TIMES DAILY
Status: DISCONTINUED | OUTPATIENT
Start: 2024-04-29 | End: 2024-04-29 | Stop reason: HOSPADM

## 2024-04-29 RX ADMIN — FOLIC ACID 1 MG: 1 TABLET ORAL at 09:23

## 2024-04-29 RX ADMIN — FLUOXETINE 40 MG: 20 CAPSULE ORAL at 09:24

## 2024-04-29 RX ADMIN — SUMATRIPTAN SUCCINATE 100 MG: 50 TABLET ORAL at 04:10

## 2024-04-29 RX ADMIN — ASPIRIN 81 MG: 81 TABLET, COATED ORAL at 09:24

## 2024-04-29 RX ADMIN — FENOFIBRATE 160 MG: 160 TABLET ORAL at 12:17

## 2024-04-29 RX ADMIN — PANTOPRAZOLE SODIUM 40 MG: 40 TABLET, DELAYED RELEASE ORAL at 05:43

## 2024-04-29 RX ADMIN — ALPRAZOLAM 0.5 MG: 0.25 TABLET ORAL at 16:50

## 2024-04-29 RX ADMIN — ATORVASTATIN CALCIUM 80 MG: 80 TABLET, FILM COATED ORAL at 09:24

## 2024-04-29 RX ADMIN — LEVETIRACETAM 500 MG: 500 TABLET, FILM COATED ORAL at 14:02

## 2024-04-29 RX ADMIN — GABAPENTIN 300 MG: 300 CAPSULE ORAL at 09:24

## 2024-04-29 RX ADMIN — ALPRAZOLAM 0.5 MG: 0.25 TABLET ORAL at 04:10

## 2024-04-29 RX ADMIN — Medication 1 TABLET: at 09:24

## 2024-04-29 RX ADMIN — GADOTERATE MEGLUMINE 11 ML: 376.9 INJECTION INTRAVENOUS at 11:27

## 2024-04-29 RX ADMIN — GABAPENTIN 300 MG: 300 CAPSULE ORAL at 14:02

## 2024-04-29 RX ADMIN — CEFTRIAXONE SODIUM 1 G: 1 INJECTION, SOLUTION INTRAVENOUS at 12:17

## 2024-04-29 RX ADMIN — PANTOPRAZOLE SODIUM 40 MG: 40 TABLET, DELAYED RELEASE ORAL at 15:38

## 2024-04-29 RX ADMIN — PROPRANOLOL HYDROCHLORIDE 60 MG: 60 CAPSULE, EXTENDED RELEASE ORAL at 12:17

## 2024-04-29 RX ADMIN — BUSPIRONE HYDROCHLORIDE 30 MG: 10 TABLET ORAL at 09:24

## 2024-04-29 RX ADMIN — THIAMINE HYDROCHLORIDE 100 MG: 100 INJECTION, SOLUTION INTRAMUSCULAR; INTRAVENOUS at 09:23

## 2024-04-29 ASSESSMENT — COGNITIVE AND FUNCTIONAL STATUS - GENERAL
DAILY ACTIVITIY SCORE: 23
TOILETING: A LITTLE
MOBILITY SCORE: 24

## 2024-04-29 ASSESSMENT — LIFESTYLE VARIABLES
ANXIETY: NO ANXIETY, AT EASE
TREMOR: NO TREMOR
NAUSEA AND VOMITING: NO NAUSEA AND NO VOMITING
HEADACHE, FULLNESS IN HEAD: NOT PRESENT
TOTAL SCORE: 4
ORIENTATION AND CLOUDING OF SENSORIUM: ORIENTED AND CAN DO SERIAL ADDITIONS
AUDITORY DISTURBANCES: NOT PRESENT
TREMOR: NO TREMOR
VISUAL DISTURBANCES: NOT PRESENT
AGITATION: NORMAL ACTIVITY
PAROXYSMAL SWEATS: NO SWEAT VISIBLE
HEADACHE, FULLNESS IN HEAD: MILD
ANXIETY: 2
ORIENTATION AND CLOUDING OF SENSORIUM: ORIENTED AND CAN DO SERIAL ADDITIONS
ANXIETY: NO ANXIETY, AT EASE
AUDITORY DISTURBANCES: NOT PRESENT
NAUSEA AND VOMITING: NO NAUSEA AND NO VOMITING
TOTAL SCORE: 0
TOTAL SCORE: 2
AGITATION: NORMAL ACTIVITY
VISUAL DISTURBANCES: NOT PRESENT
NAUSEA AND VOMITING: NO NAUSEA AND NO VOMITING
PAROXYSMAL SWEATS: NO SWEAT VISIBLE
AGITATION: NORMAL ACTIVITY
VISUAL DISTURBANCES: NOT PRESENT
TREMOR: NO TREMOR
HEADACHE, FULLNESS IN HEAD: NOT PRESENT
PAROXYSMAL SWEATS: 2
ORIENTATION AND CLOUDING OF SENSORIUM: ORIENTED AND CAN DO SERIAL ADDITIONS
AUDITORY DISTURBANCES: NOT PRESENT

## 2024-04-29 ASSESSMENT — PAIN SCALES - GENERAL: PAINLEVEL_OUTOF10: 0 - NO PAIN

## 2024-04-29 NOTE — DISCHARGE SUMMARY
Discharge Diagnosis  Seizure (Multi)    Issues Requiring Follow-Up  Pcp and neurology    Discharge Meds     Your medication list        START taking these medications        Instructions Last Dose Given Next Dose Due   levETIRAcetam 500 mg tablet  Commonly known as: Keppra      Take 1 tablet (500 mg) by mouth 2 times a day.              CONTINUE taking these medications        Instructions Last Dose Given Next Dose Due   ALPRAZolam 0.5 mg tablet  Commonly known as: Xanax           aspirin 81 mg EC tablet           atorvastatin 80 mg tablet  Commonly known as: Lipitor           busPIRone 30 mg tablet  Commonly known as: Buspar           cholecalciferol (vitamin D3) 250 mcg (10,000 unit) capsule           fenofibrate 145 mg tablet  Commonly known as: Tricor           FLUoxetine 40 mg capsule  Commonly known as: PROzac           fluticasone 50 mcg/actuation nasal spray  Commonly known as: Flonase           gabapentin 800 mg tablet  Commonly known as: Neurontin           hydrOXYzine pamoate 50 mg capsule  Commonly known as: Vistaril           meclizine 25 mg tablet  Commonly known as: Antivert           multivitamin with minerals iron-free  Commonly known as: Centrum Silver           pantoprazole 40 mg EC tablet  Commonly known as: ProtoNix           ProAir HFA 90 mcg/actuation inhaler  Generic drug: albuterol           propranolol LA 60 mg 24 hr capsule  Commonly known as: Inderal LA           QUEtiapine 300 mg tablet  Commonly known as: SEROquel           SUMAtriptan 100 mg tablet  Commonly known as: Imitrex                     Where to Get Your Medications        These medications were sent to [id number] - [place], [place] - [address]  [address] [facility] [address]      Phone: [phone]   levETIRAcetam 500 mg tablet         Test Results Pending At Discharge  Pending Labs       No current pending labs.            Hospital Course   XR chest 1 view  Narrative: STUDY:  Chest Radiograph;   04/28/2024 12:08PM  INDICATION:  Seizure.  COMPARISON:  XR Chest 08/21/2018, CTA Chest Abdomen and Pelvis 06/26/2018  ACCESSION NUMBER(S):  [id number]  ORDERING CLINICIAN:  ANKIT DUNCAN  TECHNIQUE:  Frontal chest was obtained at 12:08 hours.  FINDINGS:  No acute opacities or effusions are visualized.  Heart size is top  normal.  There is no evidence of a pneumothorax.  A cardiac monitoring  device is present.  Impression: 1.  No acute findings on single AP view of the chest.  Signed by Ray Gordon MD  CT head wo IV contrast  Narrative: Interpreted By:  David Flores,   STUDY:  CT HEAD WO IV CONTRAST;  4/28/2024 11:37 am      INDICATION:  Signs/Symptoms:seizure.      COMPARISON:  04/02/2020      ACCESSION NUMBER(S):  [id number]      ORDERING CLINICIAN:  ANKIT DUNCAN      TECHNIQUE:  Noncontrast axial CT scan of head was performed. Angled reformats in  brain and bone windows were generated. The images were reviewed in  bone, brain, blood and soft tissue windows.      FINDINGS:  No acute edema. No acute hemorrhage. No mass effect. Ventricular  system is normal for age. No extra-axial fluid collections. Orbits  are normal. Paranasal sinuses are clear.      There is an old infarct of the right periventricular white matter  measuring 9 mm.      Impression: No acute findings. Examination appears similar prior of 2022      Signed by: David Flores 4/28/2024 11:48 AM  Dictation workstation:   [id number]     68 y.o. female presenting with PMHx of right frontal lobe CVA in 4/2022 and CVA in 2018, TIAs, PFO, HTN, HLD, BPPV, migraines, fibromyalgia, GERD, IBS, anxiety on Xanax, depression, rectocele, mandibular osteomyelitis who presents to [facility] ED for a 3 minute witnessed seizure episode without LOC.   notes a seizure of tonic-clonic type.  Denies alcohol use.  Patient states she has been more anxious the last few days and has had to use her walker more for mobility, otherwise has been in her usual state of  health.     ED course: Imaging of head and chest showed no evidence of acute findings.  Patient treated with thiamine, multivitamin, folic acid.  [facility] protocol implemented.  EEG ordered.  Patient will be admitted to observation with telemetry under Dr. Hood for further evaluation and care     Vitals: Hypertensive on admission at 201/125, has since resolved to 170/84 as of latest reading.     Labs:  GEN CHEM: Glucose 111, GFR 59  Molecular micro: Negative for flu/COVID     Past Medical History  Medical History           Past Medical History:   Diagnosis Date    Personal history of other diseases of the circulatory system       History of hypertension    Personal history of other diseases of the digestive system       History of hiatal hernia    Personal history of other endocrine, nutritional and metabolic disease       History of high cholesterol    Personal history of other mental and behavioral disorders       History of depression            Surgical History  Surgical History[]Expand by Default             Past Surgical History:   Procedure Laterality Date    HYSTERECTOMY   08/24/2018     Hysterectomy    MR HEAD ANGIO WO IV CONTRAST   4/2/2022     MR HEAD ANGIO WO IV CONTRAST 4/2/2022 [facility] CLINICAL LEGACY    MR NECK ANGIO WO IV CONTRAST   4/2/2022     MR NECK ANGIO WO IV CONTRAST 4/2/2022 [facility] CLINICAL LEGACY    OTHER SURGICAL HISTORY   08/24/2018     Bunion Correct Osteotomy Blount Double-Stem Lesser MTP Impl    OTHER SURGICAL HISTORY   03/25/2019     Neck surgery    ROTATOR CUFF REPAIR   08/24/2018     Rotator Cuff Repair            Social History  She has no history on file for tobacco use, alcohol use, and drug use.  Patient lives with  in two-story home, independent with daily activities.  Uses walker as needed.     Family History  Family History   No family history on file.         Allergies  Erythromycin, Aspirin-acetaminophen-caffeine, Bromocriptine, Flexeril [cyclobenzaprine], Moxifloxacin hcl,  and Prednisone     Review of Systems  10 point ROS negative except as specified in HPI     Physical Exam  Constitutional:       General: She is not in acute distress.     Appearance: Normal appearance. She is not toxic-appearing.   HENT:      Head: Normocephalic and atraumatic.      Right Ear: External ear normal.      Left Ear: External ear normal.   Eyes:      Conjunctiva/sclera: Conjunctivae normal.   Cardiovascular:      Rate and Rhythm: Normal rate and regular rhythm.      Pulses: Normal pulses.      Heart sounds: Normal heart sounds. No murmur heard.  Pulmonary:      Effort: No respiratory distress.      Breath sounds: Normal breath sounds. No stridor. No wheezing.   Abdominal:      General: Abdomen is flat. Bowel sounds are normal. There is no distension.      Palpations: Abdomen is soft.      Tenderness: There is no abdominal tenderness. There is no guarding.   Musculoskeletal:         General: No swelling, deformity or signs of injury.      Cervical back: Normal range of motion and neck supple.      Right lower leg: No edema.      Left lower leg: No edema.      Comments: Moving arms and legs   Skin:     General: Skin is warm and dry.      Coloration: Skin is not pale.      Findings: No erythema.   Neurological:      General: No focal deficit present.      Mental Status: She is alert.   Psychiatric:         Behavior: Behavior normal.            Relevant Results               Results for orders placed or performed during the hospital encounter of 04/28/24 (from the past 24 hour(s))   POCT GLUCOSE   Result Value Ref Range     POCT Glucose 122 (H) 74 - 99 mg/dL   CBC and Auto Differential   Result Value Ref Range     WBC 4.7 4.4 - 11.3 x10*3/uL     nRBC 0.0 0.0 - 0.0 /100 WBCs     RBC 4.43 4.00 - 5.20 x10*6/uL     Hemoglobin 11.6 (L) 12.0 - 16.0 g/dL     Hematocrit 36.0 36.0 - 46.0 %     MCV 81 80 - 100 fL     MCH 26.2 26.0 - 34.0 pg     MCHC 32.2 32.0 - 36.0 g/dL     RDW 17.2 (H) 11.5 - 14.5 %     Platelets  293 150 - 450 x10*3/uL     Neutrophils % 47.5 40.0 - 80.0 %     Immature Granulocytes %, Automated 0.2 0.0 - 0.9 %     Lymphocytes % 38.1 13.0 - 44.0 %     Monocytes % 6.3 2.0 - 10.0 %     Eosinophils % 6.8 0.0 - 6.0 %     Basophils % 1.1 0.0 - 2.0 %     Neutrophils Absolute 2.25 1.20 - 7.70 x10*3/uL     Immature Granulocytes Absolute, Automated 0.01 0.00 - 0.70 x10*3/uL     Lymphocytes Absolute 1.80 1.20 - 4.80 x10*3/uL     Monocytes Absolute 0.30 0.10 - 1.00 x10*3/uL     Eosinophils Absolute 0.32 0.00 - 0.70 x10*3/uL     Basophils Absolute 0.05 0.00 - 0.10 x10*3/uL   Comprehensive metabolic panel   Result Value Ref Range     Glucose 111 (H) 74 - 99 mg/dL     Sodium 140 136 - 145 mmol/L     Potassium 3.5 3.5 - 5.3 mmol/L     Chloride 107 98 - 107 mmol/L     Bicarbonate 23 21 - 32 mmol/L     Anion Gap 14 10 - 20 mmol/L     Urea Nitrogen 23 6 - 23 mg/dL     Creatinine 1.03 0.50 - 1.05 mg/dL     eGFR 59 (L) >60 mL/min/1.73m*2     Calcium 9.4 8.6 - 10.3 mg/dL     Albumin 4.3 3.4 - 5.0 g/dL     Alkaline Phosphatase 33 33 - 136 U/L     Total Protein 6.7 6.4 - 8.2 g/dL     AST 13 9 - 39 U/L     Bilirubin, Total 0.6 0.0 - 1.2 mg/dL     ALT 10 7 - 45 U/L   Lactate   Result Value Ref Range     Lactate 1.2 0.4 - 2.0 mmol/L   Magnesium   Result Value Ref Range     Magnesium 1.81 1.60 - 2.40 mg/dL   Sars-CoV-2 and Influenza A/B PCR   Result Value Ref Range     Flu A Result Not Detected Not Detected     Flu B Result Not Detected Not Detected     Coronavirus 2019, PCR Not Detected Not Detected      XR chest 1 view     Result Date: 4/28/2024  STUDY: Chest Radiograph;  04/28/2024 12:08PM INDICATION: Seizure. COMPARISON: XR Chest 08/21/2018, CTA Chest Abdomen and Pelvis 06/26/2018 ACCESSION NUMBER(S): [id number] ORDERING CLINICIAN: ANKIT DUNCAN TECHNIQUE:  Frontal chest was obtained at 12:08 hours. FINDINGS: No acute opacities or effusions are visualized.  Heart size is top normal.  There is no evidence of a pneumothorax.  A  cardiac monitoring device is present.     1.  No acute findings on single AP view of the chest. Signed by Ray Gordon MD     CT head wo IV contrast     Result Date: 4/28/2024  Interpreted By:  David Flores, STUDY: CT HEAD WO IV CONTRAST;  4/28/2024 11:37 am   INDICATION: Signs/Symptoms:seizure.   COMPARISON: 04/02/2020   ACCESSION NUMBER(S): [id number]   ORDERING CLINICIAN: ANKIT DUNCAN   TECHNIQUE: Noncontrast axial CT scan of head was performed. Angled reformats in brain and bone windows were generated. The images were reviewed in bone, brain, blood and soft tissue windows.   FINDINGS: No acute edema. No acute hemorrhage. No mass effect. Ventricular system is normal for age. No extra-axial fluid collections. Orbits are normal. Paranasal sinuses are clear.   There is an old infarct of the right periventricular white matter measuring 9 mm.        No acute findings. Examination appears similar prior of 2022   Signed by: David Flores 4/28/2024 11:48 AM Dictation workstation:   [id number]      This routine EEG is normal. No epileptiform discharges or lateralizing signs are seen.        Assessment/Plan   Principal Problem:    Seizure (Multi)      See by neurology    Seizure (Multi)  -Patient to start Keppra 500 mg p.o. twice daily  routine EEG testingunremarkable  Since patient has been negative for any seizure activity throughout course of admission, she is appropriate for discharge after testing is complete given she will be discharged on antiepileptic medications.  Continue seizure precautions while hospitalized.  -Patient to restrict all driving practices x 6 months post seizure activity per [place] law recommendations  -Patient to follow-up with [facility] neurologist Dr. Regalado within 1 month of discharge.       #Seizure  #UTI  #Secondary hypertension         #Chronic conditions: HTN, HLD, fibromyalgia, GERD, IBS, anxiety, depression      David Hood MD

## 2024-04-29 NOTE — DISCHARGE INSTRUCTIONS
-You are to restrict all driving practices x 6 months post seizure activity per [place] law recommendations

## 2024-04-29 NOTE — SIGNIFICANT EVENT
EEG completed today with results as follows:    IMPRESSION:  IMPRESSION     This routine EEG is normal. No epileptiform discharges or lateralizing signs are seen.     A full report will be scanned into the patient's chart at a later time.

## 2024-04-29 NOTE — PROGRESS NOTES
"   24 1517   Discharge Planning   Living Arrangements Spouse/significant other   Support Systems Spouse/significant other   Assistance Needed Pt denies needing any assistance   Type of Residence Private residence   Number of Stairs to Enter Residence 3   Number of Stairs Within Residence 3   Do you have animals or pets at home? No   Who is requesting discharge planning? Provider   Patient expects to be discharged to: Home vs SNF?   Does the patient need discharge transport arranged? Yes   RoundTrip coordination needed? Yes     TCC Note: Met with pt and  at bedside, introduced self and explained role on the Transition of Care team. Verified name, , demographics, insurance and PCP as Dr. Awadalah. Pt was previously Independent and  did all transportation. ER contact is Vijay, phone: [phone]. Pt uses a walker \"on a bad day\". Pharmacy is [facility].. Will watch for Therapy notes. Christie Samuels, MSN, RN, TCC.  "

## 2024-04-29 NOTE — PROGRESS NOTES
Lat entry attending admit note/hp    Taylor Winslow is a 68 y.o. female on day 0 of admission presenting with Seizure (Multi).      Subjective Pt no distress about tp have eeg done       Objective     Last Recorded Vitals  /77 (BP Location: Left arm, Patient Position: Lying)   Pulse 67   Temp 36.3 °C (97.3 °F)   Resp 18   Wt 57.2 kg (126 lb)   SpO2 93%   Intake/Output last 3 Shifts:  No intake or output data in the 24 hours ending 04/29/24 0802    Admission Weight  Weight: 57.2 kg (126 lb) (04/28/24 1103)    Daily Weight  04/28/24 : 57.2 kg (126 lb)    Image Results  XR chest 1 view  Narrative: STUDY:  Chest Radiograph;  04/28/2024 12:08PM  INDICATION:  Seizure.  COMPARISON:  XR Chest 08/21/2018, CTA Chest Abdomen and Pelvis 06/26/2018  ACCESSION NUMBER(S):  [id number]  ORDERING CLINICIAN:  ANKIT DUNCAN  TECHNIQUE:  Frontal chest was obtained at 12:08 hours.  FINDINGS:  No acute opacities or effusions are visualized.  Heart size is top  normal.  There is no evidence of a pneumothorax.  A cardiac monitoring  device is present.  Impression: 1.  No acute findings on single AP view of the chest.  Signed by Ray Gordon MD  CT head wo IV contrast  Narrative: Interpreted By:  David Flores,   STUDY:  CT HEAD WO IV CONTRAST;  4/28/2024 11:37 am      INDICATION:  Signs/Symptoms:seizure.      COMPARISON:  04/02/2020      ACCESSION NUMBER(S):  [id number]      ORDERING CLINICIAN:  ANKIT DUNCAN      TECHNIQUE:  Noncontrast axial CT scan of head was performed. Angled reformats in  brain and bone windows were generated. The images were reviewed in  bone, brain, blood and soft tissue windows.      FINDINGS:  No acute edema. No acute hemorrhage. No mass effect. Ventricular  system is normal for age. No extra-axial fluid collections. Orbits  are normal. Paranasal sinuses are clear.      There is an old infarct of the right periventricular white matter  measuring 9 mm.      Impression: No acute findings.  Examination appears similar prior of 2022      Signed by: David Flores 4/28/2024 11:48 AM  Dictation workstation:   [id number]    68 y.o. female presenting with PMHx of right frontal lobe CVA in 4/2022 and CVA in 2018, TIAs, PFO, HTN, HLD, BPPV, migraines, fibromyalgia, GERD, IBS, anxiety on Xanax, depression, rectocele, mandibular osteomyelitis who presents to [facility] ED for a 3 minute witnessed seizure episode without LOC.   notes a seizure of tonic-clonic type.  Denies alcohol use.  Patient states she has been more anxious the last few days and has had to use her walker more for mobility, otherwise has been in her usual state of health.     ED course: Imaging of head and chest showed no evidence of acute findings.  Patient treated with thiamine, multivitamin, folic acid.  [place] protocol implemented.  EEG ordered.  Patient will be admitted to observation with telemetry under Dr. Hood for further evaluation and care     Vitals: Hypertensive on admission at 201/125, has since resolved to 170/84 as of latest reading.     Labs:  GEN CHEM: Glucose 111, GFR 59  Molecular micro: Negative for flu/COVID     Past Medical History  Medical History        Past Medical History:   Diagnosis Date    Personal history of other diseases of the circulatory system       History of hypertension    Personal history of other diseases of the digestive system       History of hiatal hernia    Personal history of other endocrine, nutritional and metabolic disease       History of high cholesterol    Personal history of other mental and behavioral disorders       History of depression            Surgical History  Surgical History[]Expand by Default         Past Surgical History:   Procedure Laterality Date    HYSTERECTOMY   08/24/2018     Hysterectomy    MR HEAD ANGIO WO IV CONTRAST   4/2/2022     MR HEAD ANGIO WO IV CONTRAST 4/2/2022 [facility] CLINICAL LEGACY    MR NECK ANGIO WO IV CONTRAST   4/2/2022     MR NECK ANGIO WO IV CONTRAST  4/2/2022 [facility] CLINICAL LEGACY    OTHER SURGICAL HISTORY   08/24/2018     Bunion Correct Osteotomy Blount Double-Stem Lesser MTP Impl    OTHER SURGICAL HISTORY   03/25/2019     Neck surgery    ROTATOR CUFF REPAIR   08/24/2018     Rotator Cuff Repair            Social History  She has no history on file for tobacco use, alcohol use, and drug use.  Patient lives with  in two-story home, independent with daily activities.  Uses walker as needed.     Family History  Family History   No family history on file.        Allergies  Erythromycin, Aspirin-acetaminophen-caffeine, Bromocriptine, Flexeril [cyclobenzaprine], Moxifloxacin hcl, and Prednisone     Review of Systems  10 point ROS negative except as specified in HPI     Physical Exam  Constitutional:       General: She is not in acute distress.     Appearance: Normal appearance. She is not toxic-appearing.   HENT:      Head: Normocephalic and atraumatic.      Right Ear: External ear normal.      Left Ear: External ear normal.   Eyes:      Conjunctiva/sclera: Conjunctivae normal.   Cardiovascular:      Rate and Rhythm: Normal rate and regular rhythm.      Pulses: Normal pulses.      Heart sounds: Normal heart sounds. No murmur heard.  Pulmonary:      Effort: No respiratory distress.      Breath sounds: Normal breath sounds. No stridor. No wheezing.   Abdominal:      General: Abdomen is flat. Bowel sounds are normal. There is no distension.      Palpations: Abdomen is soft.      Tenderness: There is no abdominal tenderness. There is no guarding.   Musculoskeletal:         General: No swelling, deformity or signs of injury.      Cervical back: Normal range of motion and neck supple.      Right lower leg: No edema.      Left lower leg: No edema.      Comments: Moving arms and legs   Skin:     General: Skin is warm and dry.      Coloration: Skin is not pale.      Findings: No erythema.   Neurological:      General: No focal deficit present.      Mental Status: She is  alert.   Psychiatric:         Behavior: Behavior normal.           Relevant Results           Results for orders placed or performed during the hospital encounter of 04/28/24 (from the past 24 hour(s))   POCT GLUCOSE   Result Value Ref Range     POCT Glucose 122 (H) 74 - 99 mg/dL   CBC and Auto Differential   Result Value Ref Range     WBC 4.7 4.4 - 11.3 x10*3/uL     nRBC 0.0 0.0 - 0.0 /100 WBCs     RBC 4.43 4.00 - 5.20 x10*6/uL     Hemoglobin 11.6 (L) 12.0 - 16.0 g/dL     Hematocrit 36.0 36.0 - 46.0 %     MCV 81 80 - 100 fL     MCH 26.2 26.0 - 34.0 pg     MCHC 32.2 32.0 - 36.0 g/dL     RDW 17.2 (H) 11.5 - 14.5 %     Platelets 293 150 - 450 x10*3/uL     Neutrophils % 47.5 40.0 - 80.0 %     Immature Granulocytes %, Automated 0.2 0.0 - 0.9 %     Lymphocytes % 38.1 13.0 - 44.0 %     Monocytes % 6.3 2.0 - 10.0 %     Eosinophils % 6.8 0.0 - 6.0 %     Basophils % 1.1 0.0 - 2.0 %     Neutrophils Absolute 2.25 1.20 - 7.70 x10*3/uL     Immature Granulocytes Absolute, Automated 0.01 0.00 - 0.70 x10*3/uL     Lymphocytes Absolute 1.80 1.20 - 4.80 x10*3/uL     Monocytes Absolute 0.30 0.10 - 1.00 x10*3/uL     Eosinophils Absolute 0.32 0.00 - 0.70 x10*3/uL     Basophils Absolute 0.05 0.00 - 0.10 x10*3/uL   Comprehensive metabolic panel   Result Value Ref Range     Glucose 111 (H) 74 - 99 mg/dL     Sodium 140 136 - 145 mmol/L     Potassium 3.5 3.5 - 5.3 mmol/L     Chloride 107 98 - 107 mmol/L     Bicarbonate 23 21 - 32 mmol/L     Anion Gap 14 10 - 20 mmol/L     Urea Nitrogen 23 6 - 23 mg/dL     Creatinine 1.03 0.50 - 1.05 mg/dL     eGFR 59 (L) >60 mL/min/1.73m*2     Calcium 9.4 8.6 - 10.3 mg/dL     Albumin 4.3 3.4 - 5.0 g/dL     Alkaline Phosphatase 33 33 - 136 U/L     Total Protein 6.7 6.4 - 8.2 g/dL     AST 13 9 - 39 U/L     Bilirubin, Total 0.6 0.0 - 1.2 mg/dL     ALT 10 7 - 45 U/L   Lactate   Result Value Ref Range     Lactate 1.2 0.4 - 2.0 mmol/L   Magnesium   Result Value Ref Range     Magnesium 1.81 1.60 - 2.40 mg/dL    Sars-CoV-2 and Influenza A/B PCR   Result Value Ref Range     Flu A Result Not Detected Not Detected     Flu B Result Not Detected Not Detected     Coronavirus 2019, PCR Not Detected Not Detected      XR chest 1 view     Result Date: 4/28/2024  STUDY: Chest Radiograph;  04/28/2024 12:08PM INDICATION: Seizure. COMPARISON: XR Chest 08/21/2018, CTA Chest Abdomen and Pelvis 06/26/2018 ACCESSION NUMBER(S): [id number] ORDERING CLINICIAN: ANKIT DUNCAN TECHNIQUE:  Frontal chest was obtained at 12:08 hours. FINDINGS: No acute opacities or effusions are visualized.  Heart size is top normal.  There is no evidence of a pneumothorax.  A cardiac monitoring device is present.     1.  No acute findings on single AP view of the chest. Signed by Ray Gordon MD     CT head wo IV contrast     Result Date: 4/28/2024  Interpreted By:  David Flores, STUDY: CT HEAD WO IV CONTRAST;  4/28/2024 11:37 am   INDICATION: Signs/Symptoms:seizure.   COMPARISON: 04/02/2020   ACCESSION NUMBER(S): [id number]   ORDERING CLINICIAN: ANKIT DUNCAN   TECHNIQUE: Noncontrast axial CT scan of head was performed. Angled reformats in brain and bone windows were generated. The images were reviewed in bone, brain, blood and soft tissue windows.   FINDINGS: No acute edema. No acute hemorrhage. No mass effect. Ventricular system is normal for age. No extra-axial fluid collections. Orbits are normal. Paranasal sinuses are clear.   There is an old infarct of the right periventricular white matter measuring 9 mm.        No acute findings. Examination appears similar prior of 2022   Signed by: David Flores 4/28/2024 11:48 AM Dictation workstation:   [id number]       Assessment/Plan   Principal Problem:    Seizure (Multi)     #Seizure  #UTI  #Secondary hypertension  - admitted to observation with telemetry   - Neuro consult  - Seizure precautions  - Fall precautions  - Follow EEG  - Morning labs  - Follow UA     #DVT PPx  -Lovenox  -SCD     #Chronic  conditions: HTN, HLD, fibromyalgia, GERD, IBS, anxiety, depression  - Cardiac diet  - Continue home meds                David Hood MD

## 2024-04-29 NOTE — CARE PLAN
The patient's goals for the shift include no seizures    The clinical goals for the shift include pt will not have any seizure activity    Pt has bilateral side rails padded seizure precautions in place

## 2024-04-29 NOTE — PROGRESS NOTES
"Taylor Winslow is a 68 y.o. female on day 0 of admission presenting with Seizure (Multi).      Subjective   MRI of the brain with and without contrast is negative for any acute findings that could have provoked seizure activity.  EEG to be completed later today; as patient is stable from a neurologic standpoint she is appropriate for discharge after completion of testing and is to receive her first dose of Keppra 500 mg p.o. twice daily here at this facility prior to discharge.       Objective     Last Recorded Vitals  Blood pressure (!) 195/95, pulse 73, temperature 36.3 °C (97.3 °F), resp. rate 18, height 1.499 m (4' 11\"), weight 57.2 kg (126 lb), SpO2 (!) 89%.  Physical exam/neurological exam  Patient seen and examined at this time; upon entering room she is resting quietly in bed. Appears fully developed and well nourished.   Mental status: A&Ox 3. Memory testing, fund of knowledge and concentration within normal limits. Speech is fluent and negative for any paraphrasic errors.     Cranial Nerves:  Optic II/ Oculomotor III: Fundoscopic exam was technically difficult. PERRL +2. Visual fields are full. Convergence and accomodation noted without difficulty. Negative for deficits to visual acuity confrontation via static-finger wiggle test. Eyes appear aligned and free of exophthalmos and ptosis. Sclera are white bilaterally and lens are free from clouding.   Oculomotor III/ Trochlear IV/ Abducens VI: Extraocular movements are full, with no evidence of nystagmus. Negative for diplopia.   Trigeminal V: Facial sensation is intact to light touch. Corneal reflex responsive when threatened bilaterally.  Facial VII: intact; nose is midline, with no evidence of flattening to nasolabial folds noted and mouth is negative for evidence of droop. Patient is successfully able to follow commands to raise eyebrows, squeeze eyes shut, smile and show teeth, frown, and puff out cheeks.   Acoustic VIII: Hearing is intact " bilaterally.  Glossopharngyeal IX/ Vagus X: Palate elevates symmetrically to phonation. Findings are negative for uvula deviation or dysphagia.   Spinal accessory XI: Sternocleidomastoid/ upper trapezius is 5/5 to strength testing. No asymmetry noted to strength, bulk, or tone.   Hypoglossal XII: Tongue is midline and without deviations. Phonation is articulate and is negative for findings of dysarthria or aphasia.     Motor exam: negative for evidence of involuntary movements or fasiculations. BUE flexion of biceps and brachioradialis graded 5/5, in addition to extension of triceps at elbow and wrists. BUE  strength 5/5, along with finger abduction and thumb opposition. BLE hip flexion, extension, adduction, and abduction 5/5. Knee extension & flexion 5/5. Ankle dorsiflexion and plantarflexion 5/5. Normal bulk and normal tone.     Sensory exam: Sensation is intact to light touch throughout.    Reflexes: Reflexes are 2+ and symmetric. Bilateral plantar responses are flexor. Ankle jerks symmetric.     Coordination: finger-to-nose testing is negative for signs of dysmetria. Pronator drift testing to BUE negative. Rapid alternating hand movements WNL.    Gait exam: negative for ataxia.    Relevant Results  Scheduled medications  aspirin, 81 mg, oral, Daily  atorvastatin, 80 mg, oral, q AM  busPIRone, 30 mg, oral, BID  cefTRIAXone, 1 g, intravenous, q24h  enoxaparin, 40 mg, subcutaneous, q24h  fenofibrate, 160 mg, oral, Daily  FLUoxetine, 40 mg, oral, Daily  folic acid, 1 mg, oral, Daily  gabapentin, 300 mg, oral, TID  levETIRAcetam, 500 mg, oral, BID  multivitamin with minerals, 1 tablet, oral, Daily  pantoprazole, 40 mg, oral, BID AC  polyethylene glycol, 17 g, oral, Daily  propranolol LA, 60 mg, oral, Daily  QUEtiapine, 300 mg, oral, Nightly  [START ON 5/1/2024] thiamine, 100 mg, oral, Daily  thiamine, 100 mg, intravenous, Daily      Continuous medications     PRN medications  PRN medications: albuterol,  ALPRAZolam, diazePAM, fluticasone, hydrOXYzine pamoate, meclizine, ondansetron **OR** ondansetron, SUMAtriptan  MR brain w and wo IV contrast    Result Date: 4/29/2024  Interpreted By:  So Major  and Eric Gordillo STUDY: MR BRAIN W AND WO IV CONTRAST; 4/29/2024 11:44 am   INDICATION: Signs/Symptoms:seizure protocol.   COMPARISON: CT of the head 04/28/2024. MRI of the brain 04/02/2022.   ACCESSION NUMBER(S): [id number]   ORDERING CLINICIAN: BRENNON MADERA   TECHNIQUE: Axial T2, FLAIR, DWI, gradient echo T2 and MPRAGE T1 weighted images of the brain were acquired. Postcontrast T1 weighted images were acquired after administration of 11 mL Dotarem gadolinium based intravenous contrast.   FINDINGS: Image quality is somewhat degraded by motion.   CSF Spaces: Ventricles, sulci and basal cisterns are diffusely prominent commensurate with moderate parenchymal volume loss. No abnormal extra-axial collection.   Parenchyma: No restricted diffusion to suggest acute infarction. Chronic infarcts noted within the right frontal lobe corona radiata extending to the right frontal operculum, right occipital lobe, left thalamus and left cerebellar hemisphere. Nonspecific T2/FLAIR hyperintense signal throughout the supratentorial white matter and fernando, probable small-vessel ischemic disease. There is no abnormal parenchymal enhancement. No evidence of intracranial hemorrhage. No mass effect or midline shift.  The bilateral mesial temporal lobes are symmetric without abnormal signal. The corpus callosum and other midline intracranial structures are within normal limits.   Paranasal Sinuses and Mastoids: Mucosal thickening is scattered throughout the visualized paranasal sinuses. Mastoid air cells are clear.   The major intracranial flow voids are patent. No abnormal filling defect is identified within the dural venous sinuses.       No acute infarct, intracranial mass effect or midline shift. No abnormal parenchymal  enhancement.   Nonspecific white matter signal changes are compatible with small-vessel ischemic disease.   Multiple old infarcts involving the right frontal lobe, right parietal lobe, left thalamus and left cerebellum.   I personally reviewed the images/study and I agree with the findings as stated by Dr. Reyes.   MACRO: None   Signed by: So Major 4/29/2024 12:10 PM Dictation workstation:   [id number]    ECG 12 lead    Result Date: 4/29/2024  Normal sinus rhythm Moderate voltage criteria for LVH, may be normal variant ( R in aVL , [place] product ) Borderline ECG When compared with ECG of 02-APR-2022 14:00, PREVIOUS ECG IS PRESENT    XR chest 1 view    Result Date: 4/28/2024  STUDY: Chest Radiograph;  04/28/2024 12:08PM INDICATION: Seizure. COMPARISON: XR Chest 08/21/2018, CTA Chest Abdomen and Pelvis 06/26/2018 ACCESSION NUMBER(S): [id number] ORDERING CLINICIAN: ANKIT DUNCAN TECHNIQUE:  Frontal chest was obtained at 12:08 hours. FINDINGS: No acute opacities or effusions are visualized.  Heart size is top normal.  There is no evidence of a pneumothorax.  A cardiac monitoring device is present.    1.  No acute findings on single AP view of the chest. Signed by Ray Gordon MD    CT head wo IV contrast    Result Date: 4/28/2024  Interpreted By:  David Flores, STUDY: CT HEAD WO IV CONTRAST;  4/28/2024 11:37 am   INDICATION: Signs/Symptoms:seizure.   COMPARISON: 04/02/2020   ACCESSION NUMBER(S): [id number]   ORDERING CLINICIAN: ANKIT DUNCAN   TECHNIQUE: Noncontrast axial CT scan of head was performed. Angled reformats in brain and bone windows were generated. The images were reviewed in bone, brain, blood and soft tissue windows.   FINDINGS: No acute edema. No acute hemorrhage. No mass effect. Ventricular system is normal for age. No extra-axial fluid collections. Orbits are normal. Paranasal sinuses are clear.   There is an old infarct of the right periventricular white matter measuring 9 mm.       No  acute findings. Examination appears similar prior of 2022   Signed by: David Flores 4/28/2024 11:48 AM Dictation workstation:   [id number]    BI mammo bilateral screening    Result Date: 4/4/2024  * * *Final Report* * * DATE OF EXAM: Apr  3 2024  2:55PM   CCJABIER   [id number]  [place]  [facility] [place] ACCESSION #  [id number] PROCEDURE REASON: Screening mammogram for breast cancer      * * * * Physician Interpretation * * * * RESULT: #[id number] - KANDI SCREENING BILATERAL DIGITAL SCREENING MAMMOGRAM WITH CAD: 4/3/2024 HISTORY: Screening Mammogram For Breast Cancer / Screening Mammogram-Patient reports NO symptoms. / Screening Mammogram-Patient reports NO symptoms. RESULT: TECHNIQUE: The study was acquired using full field digital technology and interpreted from soft copy. Current study was also evaluated with a Computer Aided Detection (CAD). Comparison is made to exams dated: 2/20/2023 mammogram, 9/17/2020 mammogram, 2/28/2019 mammogram, and 12/12/2017 mammogram - [facility].  There are scattered areas of fibroglandular density. A medical device partially obscures visualization of the LEFT breast. No significant masses, calcifications, or other findings are seen in either breast. There has been no significant interval change.    IMPRESSION: BENIGN FINDING There is no mammographic evidence of malignancy. A 1 year screening mammogram is recommended. Whitley castro/lesley:4/4/2024 19:24:37 Imaging Technologist(s): RT Joel(R)(M), [facility] letter sent: Normal over 40 Mammogram BI-RADS: 2 Benign finding Multiple national specialty organizations have released breast cancer screening guidelines for women at average risk for developing breast cancer - guidelines that are based on both evidence and opinion, yet differ on when to start and how often to screen for breast cancer. With representation from Breast Imaging, Internal Medicine, Women's Health, Family Medicine, and  Medical/Surgical Oncology, the [facility] has carefully reviewed the data and reached the following consensus: 1) All women should engage in shared decision-making with their providers to decide when to start and how often to screen; 2) All women should have the opportunity to start screening mammography at age 40; 3) For women ages 45-55, we recommend annual screening mammograms; 4) For women ages 55 and over, we support both the transition from an annual to a biennial interval if this aligns more with patient's values and preferences, or continuation with annual screening; 5) All women should discuss with their providers when to stop screening mammograms. : Romulo Transcribe Date/Time: Apr  3 2024  2:41P Dictated by: DEN LOVELACE MD This examination was interpreted and the report reviewed and electronically signed by: DEN LOVELACE MD on Apr 4 2024  7:24PM  EST   Results for orders placed or performed during the hospital encounter of 04/28/24 (from the past 24 hour(s))   Urinalysis with Reflex Culture and Microscopic   Result Value Ref Range    Color, Urine Yellow Straw, Yellow    Appearance, Urine Hazy (N) Clear    Specific Gravity, Urine 1.014 1.005 - 1.035    pH, Urine 6.0 5.0, 5.5, 6.0, 6.5, 7.0, 7.5, 8.0    Protein, Urine NEGATIVE NEGATIVE mg/dL    Glucose, Urine NEGATIVE NEGATIVE mg/dL    Blood, Urine NEGATIVE NEGATIVE    Ketones, Urine NEGATIVE NEGATIVE mg/dL    Bilirubin, Urine NEGATIVE NEGATIVE    Urobilinogen, Urine <2.0 <2.0 mg/dL    Nitrite, Urine POSITIVE (A) NEGATIVE    Leukocyte Esterase, Urine LARGE (3+) (A) NEGATIVE   Extra Urine Gray Tube   Result Value Ref Range    Extra Tube Hold for add-ons.    Microscopic Only, Urine   Result Value Ref Range    WBC, Urine 21-50 (A) 1-5, NONE /HPF    WBC Clumps, Urine RARE Reference range not established. /HPF    RBC, Urine NONE NONE, 1-2, 3-5 /HPF    Bacteria, Urine 1+ (A) NONE SEEN /HPF   CBC   Result Value Ref Range    WBC  5.9 4.4 - 11.3 x10*3/uL    nRBC 0.0 0.0 - 0.0 /100 WBCs    RBC 4.04 4.00 - 5.20 x10*6/uL    Hemoglobin 10.5 (L) 12.0 - 16.0 g/dL    Hematocrit 33.4 (L) 36.0 - 46.0 %    MCV 83 80 - 100 fL    MCH 26.0 26.0 - 34.0 pg    MCHC 31.4 (L) 32.0 - 36.0 g/dL    RDW 17.3 (H) 11.5 - 14.5 %    Platelets 279 150 - 450 x10*3/uL   Basic metabolic panel   Result Value Ref Range    Glucose 92 74 - 99 mg/dL    Sodium 141 136 - 145 mmol/L    Potassium 3.9 3.5 - 5.3 mmol/L    Chloride 109 (H) 98 - 107 mmol/L    Bicarbonate 24 21 - 32 mmol/L    Anion Gap 12 10 - 20 mmol/L    Urea Nitrogen 23 6 - 23 mg/dL    Creatinine 1.05 0.50 - 1.05 mg/dL    eGFR 58 (L) >60 mL/min/1.73m*2    Calcium 9.1 8.6 - 10.3 mg/dL                         [place] Coma Scale  Best Eye Response: Spontaneous  Best Verbal Response: Oriented  Best Motor Response: Follows commands  [place] Coma Scale Score: 15                             Assessment/Plan      Principal Problem:    Seizure (Multi)  -Patient to start Keppra 500 mg p.o. twice daily today after completion of routine EEG testing.  Since patient has been negative for any seizure activity throughout course of admission, she is appropriate for discharge after testing is complete given she will be discharged on antiepileptic medications.  Continue seizure precautions while hospitalized.  -Patient to restrict all driving practices x 6 months post seizure activity per [place] law recommendations  -Patient to follow-up with [facility] neurologist Dr. Regalado within 1 month of discharge.    Total face-to-face time spent with patient today was approximately 30 minutes; more than 50% of my time was spent counseling patient on: preparation to see patient via chart review of resulted laboratory values, radiographic imaging, prescribed medications, and impairment of involved organ systems. Time also spent on medical examination, placing appropriate orders for testing/medications, communicating with other health care  providers, counseling/educating the patient/family, and care coordination.    Neurology to sign off at this time. Thank you for the opportunity to be a part of this patient's multidisciplinary treatment team.  If any additional questions or concerns arise, please do not hesitate to contact me or the on-call neurologist via [facility].    *This note was created using voice recognition transcription software. Despite proofreading, unintentional typographical errors may be present. Please contact the department of neurology with any questions or concerns.       Anita Arizmendi, SHERRILL-CNP

## 2024-04-29 NOTE — CARE PLAN
The patient's goals for the shift include no seizures    The clinical goals for the shift include pt will be seizure free    Over the shift, the patient did not make progress toward the following goals. Barriers to progression include recognition of seizure activity. Recommendations to address these barriers include frequent rounds.

## 2024-05-03 LAB
ATRIAL RATE: 72 BPM
P AXIS: 35 DEGREES
P OFFSET: 195 MS
P ONSET: 139 MS
PR INTERVAL: 142 MS
Q ONSET: 210 MS
QRS COUNT: 12 BEATS
QRS DURATION: 92 MS
QT INTERVAL: 434 MS
QTC CALCULATION(BAZETT): 475 MS
QTC FREDERICIA: 461 MS
R AXIS: -15 DEGREES
T AXIS: 7 DEGREES
T OFFSET: 427 MS
VENTRICULAR RATE: 72 BPM

## 2024-08-12 ENCOUNTER — HOSPITAL ENCOUNTER (EMERGENCY)
Facility: HOSPITAL | Age: 69
Discharge: HOME | End: 2024-08-12
Payer: MEDICARE

## 2024-08-12 ENCOUNTER — APPOINTMENT (OUTPATIENT)
Dept: RADIOLOGY | Facility: HOSPITAL | Age: 69
End: 2024-08-12
Payer: MEDICARE

## 2024-08-12 VITALS
SYSTOLIC BLOOD PRESSURE: 123 MMHG | DIASTOLIC BLOOD PRESSURE: 63 MMHG | WEIGHT: 120 LBS | BODY MASS INDEX: 24.19 KG/M2 | OXYGEN SATURATION: 97 % | RESPIRATION RATE: 18 BRPM | HEIGHT: 59 IN | HEART RATE: 71 BPM | TEMPERATURE: 98.4 F

## 2024-08-12 DIAGNOSIS — L03.031 CELLULITIS OF TOE OF RIGHT FOOT: ICD-10-CM

## 2024-08-12 DIAGNOSIS — L98.9 SKIN LESION OF FOOT: Primary | ICD-10-CM

## 2024-08-12 PROCEDURE — 99283 EMERGENCY DEPT VISIT LOW MDM: CPT

## 2024-08-12 PROCEDURE — 2500000001 HC RX 250 WO HCPCS SELF ADMINISTERED DRUGS (ALT 637 FOR MEDICARE OP)

## 2024-08-12 PROCEDURE — 73660 X-RAY EXAM OF TOE(S): CPT | Mod: RT

## 2024-08-12 PROCEDURE — 73660 X-RAY EXAM OF TOE(S): CPT | Mod: RIGHT SIDE

## 2024-08-12 RX ORDER — CEPHALEXIN 500 MG/1
500 CAPSULE ORAL 4 TIMES DAILY
Qty: 40 CAPSULE | Refills: 0 | Status: SHIPPED | OUTPATIENT
Start: 2024-08-12 | End: 2024-08-22

## 2024-08-12 RX ORDER — CEPHALEXIN 500 MG/1
500 CAPSULE ORAL ONCE
Status: COMPLETED | OUTPATIENT
Start: 2024-08-12 | End: 2024-08-12

## 2024-08-12 ASSESSMENT — COLUMBIA-SUICIDE SEVERITY RATING SCALE - C-SSRS
2. HAVE YOU ACTUALLY HAD ANY THOUGHTS OF KILLING YOURSELF?: NO
1. IN THE PAST MONTH, HAVE YOU WISHED YOU WERE DEAD OR WISHED YOU COULD GO TO SLEEP AND NOT WAKE UP?: NO
6. HAVE YOU EVER DONE ANYTHING, STARTED TO DO ANYTHING, OR PREPARED TO DO ANYTHING TO END YOUR LIFE?: NO

## 2024-08-12 NOTE — DISCHARGE INSTRUCTIONS
You are seen emergency room today for evaluation of a wound to your right toe.  At this time, I do suspect that it may be slightly infected due to you poking it with a needle.  However, I think it is super important that you follow-up with both a foot doctor and a dermatologist regarding her visit to the ED today.  I do have concerns that this lesion may be cancerous and should have a biopsy.  Unfortunately, this is not coming we would do in the emergency room as you are also not experiencing any other symptoms of weight loss, night sweats, or fever/chills.  If you start to experience any of the symptoms, you should return the emergency room.  Please follow-up with a primary care provider regarding your visit to the ED today.  I did also provide you with an antibiotic for the suspected infection.  You were given your first dose of antibiotic, called Keflex, the emergency room today.  The remainder was prescribed you.  Please take this antibiotic as directed and in full.

## 2024-08-12 NOTE — ED PROVIDER NOTES
HPI   Chief Complaint   Patient presents with    Wound Check       Taylor is a 69-year-old female with a past medical history of hypertension and hyperlipidemia presenting the emergency department for evaluation of a wound of the right foot.  Patient states that several months ago, she developed a raised, red bump on the distal, medial aspect of the right great toe.  She states that it stayed the same for several months until about a week ago when she decided to poke the area with a needle.  Since then, it has grown in size, erythematous, tender, and has clear drainage.  After poking with a needle, she did present to an [facility] where they updated her tetanus and started her on a topical antibiotic cream.  Patient has been using that and has not seen any worsening, but no improvement in the area.  She continues deny any systemic symptoms of fever, chills, nausea, vomiting.  Patient has been able to ambulate and feels at her baseline.  They did provide her with referral to podiatry at [facility] and she unfortunately missed her appointment today.  Because of this, she decided to present to the emergency department.  Patient denies any history of T2DM, immunosuppression, malignancy, or history of poor wound healing.  She is otherwise feeling healthy.            Patient History   Past Medical History:   Diagnosis Date    Personal history of other diseases of the circulatory system     History of hypertension    Personal history of other diseases of the digestive system     History of hiatal hernia    Personal history of other endocrine, nutritional and metabolic disease     History of high cholesterol    Personal history of other mental and behavioral disorders     History of depression     Past Surgical History:   Procedure Laterality Date    HYSTERECTOMY  08/24/2018    Hysterectomy    MR HEAD ANGIO WO IV CONTRAST  4/2/2022    MR HEAD ANGIO WO IV CONTRAST 4/2/2022 [facility] CLINICAL LEGACY    MR NECK ANGIO WO IV  CONTRAST  4/2/2022    MR NECK ANGIO WO IV CONTRAST 4/2/2022 [facility] CLINICAL LEGAC[facility]    OTHER SURGICAL HISTORY  08/24/2018    Bunion Correct Osteotomy Bl[place] Double-Stem Lesser MTP Impl    OTHER SURGICAL HISTORY  03/25/2019    Neck surgery    ROTATOR CUFF REPAIR  08/24/2018    Rotator Cuff Repair     No family history on file.  Social History     Tobacco Use    Smoking status: Former     Types: Cigarettes    Smokeless tobacco: Not on file   Substance Use Topics    Alcohol use: Not on file    Drug use: Not on file       Physical Exam   ED Triage Vitals [08/12/24 1439]   Temperature Heart Rate Respirations BP   36.9 °C (98.4 °F) 66 16 119/57      Pulse Ox Temp src Heart Rate Source Patient Position   97 % -- -- --      BP Location FiO2 (%)     -- --       Physical Exam  HENT:      Mouth/Throat:      Mouth: Mucous membranes are moist.      Pharynx: Oropharynx is clear.   Eyes:      Extraocular Movements: Extraocular movements intact.   Cardiovascular:      Rate and Rhythm: Normal rate and regular rhythm.      Pulses: Normal pulses.      Heart sounds: Normal heart sounds.   Pulmonary:      Effort: Pulmonary effort is normal.      Breath sounds: Normal breath sounds.   Abdominal:      General: Abdomen is flat.      Palpations: Abdomen is soft.   Musculoskeletal:      Cervical back: Normal range of motion. No rigidity.   Skin:     Capillary Refill: Capillary refill takes less than 2 seconds.      Findings: Lesion present.      Comments: Patient has an erythematous skin lesion on the medial, distal right great toe.  There does seem to be some central scabbing, but no drainage.  Patient does have tenderness palpation of this area.  No tenderness palpation of the metatarsal.  Intact pedal pulses.  Patient has intact passive and active range of motion and can ambulate.   Neurological:      Mental Status: She is alert and oriented to person, place, and time.         ED Course & MDM   ED Course as of 08/12/24 1820   Mon Aug 12,  2024   1550 XR toe right 2+ views      IMPRESSION:  There is no osseous abnormality associated with the skin contour  deformity medial distal aspect of the toe   [AH]      ED Course User Index  [AH] Mayela Baldwin PA-C         Diagnoses as of 08/12/24 1820   Skin lesion of foot   Cellulitis of toe of right foot                 No data recorded     [place] Coma Scale Score: 15 (08/12/24 1439 : Willian Rush, EMT)                           Medical Decision Making  Taylor is a 69-year-old female with a past medical history of hypertension and hyperlipidemia presenting the emergency department for evaluation of a wound of the right foot.      Medical Decision Making: She did not appear ill or toxic.  Vital signs reviewed and stable.  Patient has a raised erythematous lesion at the most distal aspect of the medial, right great toe.  There is not appear to be active drainage.  There is scabbing in the middle.  Patient is not currently experiencing any systemic symptoms.  We elected for only x-ray imaging today which does not show osseous abnormalities associated with the skin contour deformity of the medial, distal aspect of the toe.  I do suspect that patient has a superimposed cellulitis today and she will be treated for it with Keflex.  She was given her first dose of Keflex in the emergency room today and the remainder was prescribed.  We discussed the importance of taking the antibiotic as directed and in full.  I did also emphasize that patient needs to follow-up with both podiatry and a dermatologist for potential biopsy of the lesion.  I expressed to her my concerns that this may be a precancerous or cancerous skin lesion.  Patient was agreeable and will follow-up as directed.  We reviewed strict return precautions including night sweats, weight loss, fever/chills, nauseous vomiting, worsening of the wound.  Patient agreeable to plan and was able to ambulate without difficulty prior to discharge.  All question  concerns were addressed.     Differential diagnoses considered: Fracture, dislocation, cellulitis, gouty lesion, skin lesion, etc.     Medications given: Keflex    Diagnosis: Cellulitis of toe of right foot, skin lesion of foot    Plan: Discharge          Procedure  Procedures     Mayela Baldwin PA-C  08/12/24 1625

## 2024-08-28 ENCOUNTER — APPOINTMENT (OUTPATIENT)
Dept: DERMATOLOGY | Facility: CLINIC | Age: 69
End: 2024-08-28
Payer: MEDICARE

## 2024-08-28 DIAGNOSIS — D48.5 NEOPLASM OF UNCERTAIN BEHAVIOR OF SKIN: Primary | ICD-10-CM

## 2024-08-28 PROCEDURE — 1159F MED LIST DOCD IN RCRD: CPT | Performed by: DERMATOLOGY

## 2024-08-28 PROCEDURE — 99202 OFFICE O/P NEW SF 15 MIN: CPT | Performed by: DERMATOLOGY

## 2024-08-28 ASSESSMENT — DERMATOLOGY QUALITY OF LIFE (QOL) ASSESSMENT
RATE HOW BOTHERED YOU ARE BY SYMPTOMS OF YOUR SKIN PROBLEM (EG, ITCHING, STINGING BURNING, HURTING OR SKIN IRRITATION): 6 - ALWAYS BOTHERED
ARE THERE EXCLUSIONS OR EXCEPTIONS FOR THE QUALITY OF LIFE ASSESSMENT: NO
DATE THE QUALITY-OF-LIFE ASSESSMENT WAS COMPLETED: 67080
RATE HOW EMOTIONALLY BOTHERED YOU ARE BY YOUR SKIN PROBLEM (FOR EXAMPLE, WORRY, EMBARRASSMENT, FRUSTRATION): 6 - ALWAYS BOTHERED
WHAT SINGLE SKIN CONDITION LISTED BELOW IS THE PATIENT ANSWERING THE QUALITY-OF-LIFE ASSESSMENT QUESTIONS ABOUT: NONE OF THE ABOVE
RATE HOW BOTHERED YOU ARE BY EFFECTS OF YOUR SKIN PROBLEMS ON YOUR ACTIVITIES (EG, GOING OUT, ACCOMPLISHING WHAT YOU WANT, WORK ACTIVITIES OR YOUR RELATIONSHIPS WITH OTHERS): 6 - ALWAYS BOTHERED

## 2024-08-28 ASSESSMENT — DERMATOLOGY PATIENT ASSESSMENT
HAVE YOU HAD OR DO YOU HAVE A STAPH INFECTION: NO
DO YOU HAVE IRREGULAR MENSTRUAL CYCLES: NO
ARE YOU TRYING TO GET PREGNANT: NO
HAVE YOU HAD OR DO YOU HAVE VASCULAR DISEASE: NO
DO YOU HAVE ANY NEW OR CHANGING LESIONS: YES
WHERE ARE THESE NEW OR CHANGING LESIONS LOCATED: RIGHT GREAT TOE.
ARE YOU ON BIRTH CONTROL: NO
DO YOU USE A TANNING BED: NO
ARE YOU AN ORGAN TRANSPLANT RECIPIENT: NO

## 2024-08-28 ASSESSMENT — PATIENT GLOBAL ASSESSMENT (PGA): PATIENT GLOBAL ASSESSMENT: PATIENT GLOBAL ASSESSMENT:  4 - SEVERE

## 2024-08-28 ASSESSMENT — ITCH NUMERIC RATING SCALE: HOW SEVERE IS YOUR ITCHING?: 0

## 2024-08-28 NOTE — PROGRESS NOTES
Subjective     Taylor Winslow is a 69 y.o. female who presents for the following: Suspicious Skin Lesion (Pt here for lesion located on Right Great Toe for 6 months. Pt reports blister, bleeding, and painful. Pt currently using Mupirocin ointment. Pt has tried Keflex 500 mg 4x/daily for 10 days. ). Denies trauma to the lesion. Had hallux valgus surgery some time ago.    Review of Systems:  No other skin or systemic complaints other than what is documented elsewhere in the note.    The following portions of the chart were reviewed this encounter and updated as appropriate:          Skin Cancer History  No skin cancer on file.      Specialty Problems    None       Objective   Well appearing patient in no apparent distress; mood and affect are within normal limits.    A focused skin examination was performed of the right great toe. All findings within normal limits unless otherwise noted below.    Assessment/Plan   1. Neoplasm of uncertain behavior of skin  Right Hallux Medial Paronychium of Toe  1.1 cm pink sessile nodules with hemorrhagic crust          Lesion biopsy  Type of biopsy: tangential    Informed consent: discussed and consent obtained    Timeout: patient name, date of birth, surgical site, and procedure verified    Procedure prep:  Patient was prepped and draped  Anesthesia: the lesion was anesthetized in a standard fashion    Anesthetic:  1% lidocaine w/ epinephrine 1-100,000 local infiltration  Instrument used: DermaBlade    Hemostasis achieved with: aluminum chloride    Outcome: patient tolerated procedure well    Post-procedure details: sterile dressing applied and wound care instructions given    Dressing type: petrolatum and bandage      Staff Communication: Dermatology Local Anesthesia: 1.5% Lidocaine with Epinephrine 1:200,000 - Amount:1cc    Specimen 1 - Dermatopathology- DERM LAB  Differential Diagnosis: neuroma vs. fibroma  Check Margins Yes/No?:    Comments:    Dermpath Lab: Routine  Histopathology (formalin-fixed tissue)    Lesion concerning for fibroma vs. neuroma. The need for biopsy for definitive diagnosis recommended. Risks and benefits reviewed, see procedure note.

## 2024-08-30 ENCOUNTER — TELEPHONE (OUTPATIENT)
Dept: DERMATOLOGY | Facility: CLINIC | Age: 69
End: 2024-08-30
Payer: MEDICARE

## 2024-08-30 NOTE — TELEPHONE ENCOUNTER
Pt LM inquiring about biopsy results from appt 8/28/24.   Returned call LM informing pt biopsy was still pending.

## 2024-09-03 LAB
LAB AP ASR DISCLAIMER: NORMAL
LABORATORY COMMENT REPORT: NORMAL
PATH REPORT.FINAL DX SPEC: NORMAL
PATH REPORT.GROSS SPEC: NORMAL
PATH REPORT.RELEVANT HX SPEC: NORMAL
PATH REPORT.TOTAL CANCER: NORMAL

## 2024-09-04 NOTE — RESULT ENCOUNTER NOTE
Call placed to patient with regards to benign biopsy, likely related to previous trauma and notable foreign material in superior aspect of the skin. Wanted to discuss further with the patient and gave our number to return call.

## 2024-09-06 NOTE — RESULT ENCOUNTER NOTE
Spoke with patient with regards to biopsy and results.  She reports that area is still painful.  Advised that biopsy did not show malignancy, however likely related to her initial manipulation of the area.  I will discussed surgical management with Mohs team vs. Referral to podiatry or plastic surgery for management and place referral

## 2024-09-10 ENCOUNTER — TELEPHONE (OUTPATIENT)
Dept: DERMATOLOGY | Facility: CLINIC | Age: 69
End: 2024-09-10
Payer: MEDICARE

## 2024-09-10 DIAGNOSIS — D48.5 NEOPLASM OF UNCERTAIN BEHAVIOR OF SKIN: Primary | ICD-10-CM

## 2024-09-10 NOTE — RESULT ENCOUNTER NOTE
Spoke with Dr. Adorno and he will see patient for evaluation.  Contact patient via voicemail and left message stating that referral was placed and they will contact her for scheduling.

## 2024-09-10 NOTE — TELEPHONE ENCOUNTER
Pt LM x2 r/t biopsy results and next steps with plan of care.   Returned call and LM informing pt that Dr. Lopez LM her a message today in the morning also., and that Dr. Lopez spoke with Dr. Adorno, he will see her to evaluate her toe. However I do not schedule for him that someone from his team/scheduling dept would be reaching out to her to get her on his schedule. I will also send her a [facility] message.

## 2024-09-18 ENCOUNTER — HOSPITAL ENCOUNTER (OUTPATIENT)
Dept: VASCULAR MEDICINE | Facility: CLINIC | Age: 69
Discharge: HOME | End: 2024-09-18
Payer: MEDICARE

## 2024-09-18 DIAGNOSIS — I73.9 PERIPHERAL VASCULAR DISEASE, UNSPECIFIED (CMS-HCC): ICD-10-CM

## 2024-09-18 DIAGNOSIS — I70.209 UNSPECIFIED ATHEROSCLEROSIS OF NATIVE ARTERIES OF EXTREMITIES, UNSPECIFIED EXTREMITY (CMS-HCC): ICD-10-CM

## 2024-09-18 PROCEDURE — 93922 UPR/L XTREMITY ART 2 LEVELS: CPT

## 2024-09-18 PROCEDURE — 93922 UPR/L XTREMITY ART 2 LEVELS: CPT | Performed by: INTERNAL MEDICINE

## 2024-10-14 ENCOUNTER — APPOINTMENT (OUTPATIENT)
Dept: PODIATRY | Facility: CLINIC | Age: 69
End: 2024-10-14
Payer: MEDICARE

## 2024-12-18 ENCOUNTER — APPOINTMENT (OUTPATIENT)
Dept: DERMATOLOGY | Facility: CLINIC | Age: 69
End: 2024-12-18
Payer: MEDICARE